# Patient Record
Sex: MALE | Race: WHITE | Employment: UNEMPLOYED | URBAN - NONMETROPOLITAN AREA
[De-identification: names, ages, dates, MRNs, and addresses within clinical notes are randomized per-mention and may not be internally consistent; named-entity substitution may affect disease eponyms.]

---

## 2018-01-29 ENCOUNTER — DOCUMENTATION ONLY (OUTPATIENT)
Dept: FAMILY MEDICINE | Facility: OTHER | Age: 43
End: 2018-01-29

## 2018-02-02 ENCOUNTER — HISTORY (OUTPATIENT)
Dept: INTERNAL MEDICINE | Facility: OTHER | Age: 43
End: 2018-02-02

## 2018-02-11 ENCOUNTER — APPOINTMENT (OUTPATIENT)
Dept: CT IMAGING | Facility: HOSPITAL | Age: 43
End: 2018-02-11
Attending: FAMILY MEDICINE
Payer: COMMERCIAL

## 2018-02-11 ENCOUNTER — APPOINTMENT (OUTPATIENT)
Dept: GENERAL RADIOLOGY | Facility: HOSPITAL | Age: 43
End: 2018-02-11
Attending: FAMILY MEDICINE
Payer: COMMERCIAL

## 2018-02-11 ENCOUNTER — TRANSFERRED RECORDS (OUTPATIENT)
Dept: HEALTH INFORMATION MANAGEMENT | Facility: HOSPITAL | Age: 43
End: 2018-02-11

## 2018-02-11 ENCOUNTER — HOSPITAL ENCOUNTER (EMERGENCY)
Facility: HOSPITAL | Age: 43
Discharge: ANOTHER HOSPITAL | End: 2018-02-11
Attending: FAMILY MEDICINE | Admitting: FAMILY MEDICINE
Payer: COMMERCIAL

## 2018-02-11 VITALS — WEIGHT: 197.75 LBS | OXYGEN SATURATION: 100 % | DIASTOLIC BLOOD PRESSURE: 109 MMHG | SYSTOLIC BLOOD PRESSURE: 155 MMHG

## 2018-02-11 DIAGNOSIS — S02.81XA: ICD-10-CM

## 2018-02-11 DIAGNOSIS — S09.90XA HEAD TRAUMA, INITIAL ENCOUNTER: ICD-10-CM

## 2018-02-11 DIAGNOSIS — I62.9 INTRACRANIAL HEMORRHAGE (H): ICD-10-CM

## 2018-02-11 LAB
ALBUMIN SERPL-MCNC: 3.9 G/DL (ref 3.4–5)
ALBUMIN UR-MCNC: 10 MG/DL
ALP SERPL-CCNC: 75 U/L (ref 40–150)
ALT SERPL W P-5'-P-CCNC: 151 U/L (ref 0–70)
ANION GAP SERPL CALCULATED.3IONS-SCNC: 10 MMOL/L (ref 3–14)
APPEARANCE UR: CLEAR
APTT PPP: 24 SEC (ref 24–37)
AST SERPL W P-5'-P-CCNC: 202 U/L (ref 0–45)
BACTERIA #/AREA URNS HPF: ABNORMAL /HPF
BASOPHILS # BLD AUTO: 0.1 10E9/L (ref 0–0.2)
BASOPHILS NFR BLD AUTO: 0.7 %
BILIRUB SERPL-MCNC: 0.3 MG/DL (ref 0.2–1.3)
BILIRUB UR QL STRIP: NEGATIVE
BUN SERPL-MCNC: 11 MG/DL (ref 7–30)
CALCIUM SERPL-MCNC: 7.4 MG/DL (ref 8.5–10.1)
CHLORIDE SERPL-SCNC: 107 MMOL/L (ref 94–109)
CO2 SERPL-SCNC: 24 MMOL/L (ref 20–32)
COLOR UR AUTO: ABNORMAL
CREAT SERPL-MCNC: 0.7 MG/DL (ref 0.66–1.25)
DIFFERENTIAL METHOD BLD: ABNORMAL
EOSINOPHIL # BLD AUTO: 0 10E9/L (ref 0–0.7)
EOSINOPHIL NFR BLD AUTO: 0.3 %
ERYTHROCYTE [DISTWIDTH] IN BLOOD BY AUTOMATED COUNT: 12 % (ref 10–15)
ETHANOL SERPL-MCNC: 0.25 G/DL
GFR SERPL CREATININE-BSD FRML MDRD: >90 ML/MIN/1.7M2
GLUCOSE BLDC GLUCOMTR-MCNC: 88 MG/DL (ref 70–99)
GLUCOSE SERPL-MCNC: 88 MG/DL (ref 70–99)
GLUCOSE UR STRIP-MCNC: NEGATIVE MG/DL
HCT VFR BLD AUTO: 42.2 % (ref 40–53)
HGB BLD-MCNC: 14.7 G/DL (ref 13.3–17.7)
HGB UR QL STRIP: ABNORMAL
IMM GRANULOCYTES # BLD: 0.1 10E9/L (ref 0–0.4)
IMM GRANULOCYTES NFR BLD: 0.4 %
INR PPP: 0.91 (ref 0.8–1.2)
KETONES UR STRIP-MCNC: NEGATIVE MG/DL
LEUKOCYTE ESTERASE UR QL STRIP: NEGATIVE
LYMPHOCYTES # BLD AUTO: 1 10E9/L (ref 0.8–5.3)
LYMPHOCYTES NFR BLD AUTO: 6.9 %
MCH RBC QN AUTO: 32.7 PG (ref 26.5–33)
MCHC RBC AUTO-ENTMCNC: 34.8 G/DL (ref 31.5–36.5)
MCV RBC AUTO: 94 FL (ref 78–100)
MONOCYTES # BLD AUTO: 0.7 10E9/L (ref 0–1.3)
MONOCYTES NFR BLD AUTO: 5 %
NEUTROPHILS # BLD AUTO: 12.2 10E9/L (ref 1.6–8.3)
NEUTROPHILS NFR BLD AUTO: 86.7 %
NITRATE UR QL: NEGATIVE
NRBC # BLD AUTO: 0 10*3/UL
NRBC BLD AUTO-RTO: 0 /100
PH UR STRIP: 6 PH (ref 4.7–8)
PLATELET # BLD AUTO: 206 10E9/L (ref 150–450)
POTASSIUM SERPL-SCNC: 3.4 MMOL/L (ref 3.4–5.3)
PROT SERPL-MCNC: 7.6 G/DL (ref 6.8–8.8)
RBC # BLD AUTO: 4.5 10E12/L (ref 4.4–5.9)
RBC #/AREA URNS AUTO: >182 /HPF (ref 0–2)
SODIUM SERPL-SCNC: 141 MMOL/L (ref 133–144)
SOURCE: ABNORMAL
SP GR UR STRIP: 1.01 (ref 1–1.03)
UROBILINOGEN UR STRIP-MCNC: NORMAL MG/DL (ref 0–2)
WBC # BLD AUTO: 14 10E9/L (ref 4–11)
WBC #/AREA URNS AUTO: 4 /HPF (ref 0–2)

## 2018-02-11 PROCEDURE — 70450 CT HEAD/BRAIN W/O DYE: CPT | Mod: TC

## 2018-02-11 PROCEDURE — 40000986 XR CHEST PORT 1 VW: Mod: TC

## 2018-02-11 PROCEDURE — 36415 COLL VENOUS BLD VENIPUNCTURE: CPT | Performed by: FAMILY MEDICINE

## 2018-02-11 PROCEDURE — 80053 COMPREHEN METABOLIC PANEL: CPT | Performed by: FAMILY MEDICINE

## 2018-02-11 PROCEDURE — 85025 COMPLETE CBC W/AUTO DIFF WBC: CPT | Performed by: FAMILY MEDICINE

## 2018-02-11 PROCEDURE — 85730 THROMBOPLASTIN TIME PARTIAL: CPT | Performed by: FAMILY MEDICINE

## 2018-02-11 PROCEDURE — 99204 OFFICE O/P NEW MOD 45 MIN: CPT | Performed by: SURGERY

## 2018-02-11 PROCEDURE — 85610 PROTHROMBIN TIME: CPT | Performed by: FAMILY MEDICINE

## 2018-02-11 PROCEDURE — 99291 CRITICAL CARE FIRST HOUR: CPT | Mod: 25

## 2018-02-11 PROCEDURE — G0390 TRAUMA RESPONS W/HOSP CRITI: HCPCS

## 2018-02-11 PROCEDURE — 72125 CT NECK SPINE W/O DYE: CPT | Mod: TC

## 2018-02-11 PROCEDURE — 81001 URINALYSIS AUTO W/SCOPE: CPT | Performed by: FAMILY MEDICINE

## 2018-02-11 PROCEDURE — 00000146 ZZHCL STATISTIC GLUCOSE BY METER IP

## 2018-02-11 PROCEDURE — 99291 CRITICAL CARE FIRST HOUR: CPT | Performed by: FAMILY MEDICINE

## 2018-02-11 PROCEDURE — 40000275 ZZH STATISTIC RCP TIME EA 10 MIN

## 2018-02-11 RX ORDER — SODIUM CHLORIDE 9 MG/ML
1000 INJECTION, SOLUTION INTRAVENOUS CONTINUOUS
Status: DISCONTINUED | OUTPATIENT
Start: 2018-02-11 | End: 2018-02-11 | Stop reason: HOSPADM

## 2018-02-11 NOTE — CONSULTS
Consult Date:  02/11/2018      TRAUMA CONSULT NOTE:        I responded to the emergency room for a level 1 trauma call regarding Mr. Jose Eduardo Mchugh.  I did arrive before the patient.  The patient apparently had gotten home at approximately 2:00 in the morning after a heavy night of drinking.  His wife was already upstairs.  He apparently came up the stairs and fell.  She is uncertain as to how many stairs he fell down, but the height of the stairs is 13 stairs.  She found him lying at the bottom of the stairs next to a heating vent that he had him with his head.  She noted in the morning that there was large dent on the vent.  She noted that he was snoring.  She thought he had just passed out and covered him with a blanket and a pillow.  At approximately 7 in the morning he started to rouse.  Sat himself up, was rubbing his left arm.  She noted that he had passed urine, changed him out of his clothes.  Apparently he did help in dressing himself, but did not verbalize.  She wanted to called the ambulance at that time, but he refused.  He then fell back into a deep sleep and was snoring.  She became more concerned and did end up calling the ambulance.  When the ambulance arrived, he again was nonverbal.  I believe he was moving a bit at that time, though was not fully responsive.  He ended up being intubated and IV fluids were started.  They did note that 1 pupil was small and fixed at that time.  His vitals remained stable in the transfer to the emergency room.      On arrival in the emergency room, he was noted to have an endotracheal tube in place.  A cervical collar in place and was on a back board.  He had had 2 large bore IVs started, 1 in each arm.  He had received approximately a liter of fluid prior to arrival.  His original vitals revealed a heart rate of 96, with a blood pressure 145/94 and O2 sats were 100%.      PHYSICAL EXAMINATION:  He had a Ihsan coma scale of 3.  He had though been given sedation for  the intubation.  He was noted to have ecchymosis around both eyes with the right being significantly more than the left, with the right side being basically closed.  Minimal ecchymosis around the left eye.  There is some serosanguineous fluid coming from his left nostril.  Pupils were small at 2-3 mm with the left being slightly bigger than the right.  Neither was responsive to light.  There is no other evidence of head trauma.  No lacerations.  Both tympanic membranes were clear, without blood. His trachea was in the midline.  There is no crepitation in the neck.   CHEST:  He did have good air entry bilaterally.  Did have some creps in both lung bases.  There was some slight wheezing.  No evidence of any external chest trauma.   HEART:  Heart  sounds were normal with normal S1 and S2.  There were no murmurs.   ABDOMEN:  He did appear to have some grayish discoloration across the lower abdomen.  Really did not have the appearance of bruising.  May have been some discoloration from his clothing.  The abdomen was not distended.  There were no masses.  No evidence of any other external trauma.  His pelvis was stable.  There was easily palpable femoral pulses bilaterally.     MUSCULOSKELETAL:  On examination of the long bones.  There were no obvious fractures.  A bit of swelling around the left forearm.  Legs revealed perhaps some old abrasions.  No bruising.  No step deformities.  There was no swelling.  Pulses in his feet were normal.      The patient was log rolled.  There is no crepitation on his spine.  No evidence of any bruising.  No skin lesions.      The patient's IVs were slowed down.      LABORATORY DATA:  Blood work was taken which revealed a hemoglobin of 14.7 with a white count of 14.0.  Electrolytes were normal.  ALT and AST were slightly elevated.  Ethanol was 0.25.  INR and PTT were normal.      It did appear that Mr. Mchugh had a significant head injury with worsening consciousness and indeed [place]  coma scale of 3.  [facility] was contacted.  They felt they would prefer to have a CT scan of his head and neck prior to transfer, in case he needed to go straight to the operating room.  He was thus transferred to the CT department.  CT scan of his head did show what appears to be the diffuse cerebral edema.  Also, appeared to be a bleed deep in the left side.  A CT scan of his neck was also carried out, to my reading there did not appear to be any acute cervical injury.      The patient was transferred to the [facility] for transfer to [facility], by helicopter.  He was maintained in a cervical collar and all movement was done with log rolling, protecting his spine.      In discussion with the wife, he is otherwise healthy.  Not on any medications.  HAS NO KNOWN ALLERGIES.  Apparently though it is not uncommon for him to come home somewhat drunk.      Throughout his stay in the emergency room and CT suite, his vitals were stable.  Was running a bit hypertensive.  This did come down with some Versed which was given by the EMTs from the helicopter crew.      The patient was thus transferred to [facility], by helicopter.      I did have a discussion with his wife and parents regarding the severity of the injury.  At the moment, it appears that this is limited to a head injury.      The patient did also have a portable chest x-ray which did not suggest any acute injury.  Endotracheal tube appeared to be in good position.         JERRY MENCHACA MD             D: 2018   T: 2018   MT: KAR      Name:     DEMI BAUTISTA   MRN:      [id number]        Account:       [id number]   :      1975           Consult Date:  2018      Document: [id number]

## 2018-02-11 NOTE — PROGRESS NOTES
Pt arrives in rm 9 intubated #7.0ETT secure at 26 cm at teeth.  Bag ventilation done.  CO2 monitor in line 35-38.  Bilateral breath sounds.  Sat 98%.  Trn to CT without incident.  Trn via chopper out.

## 2018-02-11 NOTE — ED PROVIDER NOTES
History   No chief complaint on file.    HPI  Jose Eduardo Mchugh is a 42 year old male who fell down a flight of carpeted stairs landing at the bottom and hitting his head on a metal grate.  His wife heard him fall, went to him and decided he could sleep where he was, gave him a pillow and blanket and went to bed.  At that time the patient was talking to the wife, was intoxicated but alert.  This morning when she got up she checked on him again, and at that time he was not as responsive, responded minimally.  EMS was called, BLS responded initially and placed an oral airway for snoring respirations, was intercepted by ALS who intubated and obtained 2 lines.  Patient arrived in the ED after RSI, had received vecuronium, unresponsive.  Pupils are small (1mm) and unreactive.  Dr. Brown here on patient arrival.    Problem List:    Patient Active Problem List    Diagnosis Date Noted     Disorder of upper respiratory system 07/26/2010     Priority: Medium     Overview:   cannot rule out impetigo or staph carrier       Tobacco abuse 07/26/2010     Priority: Medium        Past Medical History:    Past Medical History:   Diagnosis Date     Contact with and (suspected) exposure to other hazardous, chiefly nonmedicinal, chemicals        Past Surgical History:    Past Surgical History:   Procedure Laterality Date     OTHER SURGICAL HISTORY      90912.0,PAST SURGICAL HISTORY,none       Family History:    No family history on file.    Social History:  Marital Status:  Single [1]  Social History   Substance Use Topics     Smoking status: Not on file     Smokeless tobacco: Not on file     Alcohol use Yes      Comment: Alcoholic Drinks/day: Heavy alcohol use when not working - Captain Gonzalez        Medications:      No current outpatient prescriptions on file.      Review of Systems   Unable to perform ROS: Patient unresponsive       Physical Exam   BP: 145/94  Heart Rate: 96  SpO2: 98 %      Physical Exam   Constitutional: He appears  well-developed and well-nourished. He appears distressed.   HENT:   Head: Head is without Nieves's sign.       Right Ear: External ear normal. No hemotympanum.   Left Ear: External ear normal. No hemotympanum.   Nose: Epistaxis is observed.   Mouth/Throat: Mucous membranes are normal.   Blood in right nare.  Intubated, oral cavity not evaluated.  Teeth appear intact.   Eyes: Right pupil is not reactive. Left pupil is not reactive.   1mm pupils. Not moving eyes.   Neck: Normal range of motion. Neck supple.   Cardiovascular: Normal rate, regular rhythm, normal heart sounds and intact distal pulses.    No murmur heard.  Pulmonary/Chest: Effort normal and breath sounds normal. No respiratory distress. He has no wheezes.   Abdominal: Soft. Bowel sounds are normal. He exhibits no distension.   Musculoskeletal: He exhibits no edema or deformity.   Skeletal survey entirely negative.  CT head and neck obtained.   Neurological: He is unresponsive. GCS eye subscore is 1. GCS verbal subscore is 1. GCS motor subscore is 1.   GCS 3 after sedation, 7 prior to sedation in field.   Skin: Skin is warm and dry.   Small abrasion anterior left calf.   Psychiatric:   Unable to assess.   Nursing note and vitals reviewed.      ED Course     ED Course     Procedures    Critical Care time:  was 30 minutes for this patient excluding procedures.  Trauma:  Level of trauma activation: Full  C-collar and immobilization: applied prior to arrival.  CSpine Clearance: Patient left in collar  GCS at arrival: intubated and sedated  GCS at disposition: unchanged  Full Primary and Secondary survey with appropriate immobilization of spine completed in exam section.  Consults prior to admission or transfer: None  Procedures done in the ED: none  Lactate is greater than 2 due to trauma, at this time there is no sign of severe sepsis or septic shock.       Labs Ordered and Resulted from Time of ED Arrival Up to the Time of Departure from the ED   CBC WITH  PLATELETS DIFFERENTIAL - Abnormal; Notable for the following:        Result Value    WBC 14.0 (*)     Absolute Neutrophil 12.2 (*)     All other components within normal limits   COMPREHENSIVE METABOLIC PANEL - Abnormal; Notable for the following:     Calcium 7.4 (*)      (*)      (*)     All other components within normal limits   ALCOHOL ETHYL - Abnormal; Notable for the following:     Ethanol g/dL 0.25 (*)     All other components within normal limits   ROUTINE UA WITH MICROSCOPIC REFLEX TO CULTURE - Abnormal; Notable for the following:     Blood Urine Large (*)     Protein Albumin Urine 10 (*)     WBC Urine 4 (*)     RBC Urine >182 (*)     Bacteria Urine None (*)     All other components within normal limits   INR   PARTIAL THROMBOPLASTIN TIME   GLUCOSE BY METER   MARTY COMA SCALE (GCS) ASSESSMENT   PULSE OXIMETRY NURSING   PERIPHERAL IV CATHETER       Assessments & Plan (with Medical Decision Making)   Head trauma due to fall down stairs.  See surgeon's note for full assessment.  CT shows several areas of bleeding, cerebral edema, facial fractures of right maxillary sinus and inferior orbital rim.  Patient will be transported to [facility] via air, Dr. Terrence Oneill admitting.    I have reviewed the nursing notes.    I have reviewed the findings, diagnosis, plan and need for follow up with the patient.  New Prescriptions    No medications on file       Final diagnoses:   Head trauma, initial encounter   Intracranial hemorrhage (H) - multiple, bilateral   Closed fracture of other bone of right side of face, initial encounter (H) - right maxillary sinus and inferior orbital rim       2/11/2018   [facility]     Dotty Enriquez MD  02/11/18 1026       Dotty Enriquez MD  02/11/18 1034

## 2018-02-12 NOTE — ED NOTES
Pt's vitals remained stable in CT. After CT scan pt was transferred onto [facility] cart and placed on their monitors. Report to [facility] staff by this RN and questions answered by Dr. Brown. Pt left was taken directly from CT to helicopter. Pt's wife and family able to see pt prior to pt leaving.

## 2018-02-12 NOTE — ED NOTES
Pt arrived at 0913 with [facility] with [facility] intercept. Per EMS report pt came home around 0200 this AM after being out drinking. Pt fell down 10-12 carpeted stairs and possibly hit head on a metal air vent. Pt's wife heard him fall and he told her he was ok and refused her to call an ambulance at that time. Pt's wife gave him a pillow and blanket and he slept where he landed. Pt's wife then found him this morning and he was unresponsive. Upon [facility] arrival they noted snoring respirations with O2 sats 88% and pt was responsive to painful stimuli. They placed an oral airway and called for intercept with [facility]. Upon Elise meeting up with [facility] they intubated pt with a #7 ETT, 25cm at the lips. Initial RSI meds were ketamine and succinylcholine. EMS also gave vecuronium and versed at 0900 en route to hospital. Per [facility] pt was intubated for a GCS of 7.

## 2018-11-11 ENCOUNTER — HOSPITAL ENCOUNTER (EMERGENCY)
Facility: OTHER | Age: 43
Discharge: HOME | End: 2018-11-11
Attending: FAMILY MEDICINE | Admitting: FAMILY MEDICINE
Payer: COMMERCIAL

## 2018-11-11 VITALS
DIASTOLIC BLOOD PRESSURE: 63 MMHG | SYSTOLIC BLOOD PRESSURE: 136 MMHG | RESPIRATION RATE: 14 BRPM | TEMPERATURE: 98.2 F | HEIGHT: 70 IN | WEIGHT: 175 LBS | OXYGEN SATURATION: 98 % | HEART RATE: 73 BPM | BODY MASS INDEX: 25.05 KG/M2

## 2018-11-11 DIAGNOSIS — M79.641 PAIN OF RIGHT HAND: ICD-10-CM

## 2018-11-11 DIAGNOSIS — S63.91XA SPRAIN OF HAND, RIGHT, INITIAL ENCOUNTER: ICD-10-CM

## 2018-11-11 PROCEDURE — 99283 EMERGENCY DEPT VISIT LOW MDM: CPT | Mod: Z6 | Performed by: FAMILY MEDICINE

## 2018-11-11 PROCEDURE — 99283 EMERGENCY DEPT VISIT LOW MDM: CPT | Performed by: FAMILY MEDICINE

## 2018-11-11 ASSESSMENT — ENCOUNTER SYMPTOMS
BACK PAIN: 0
MYALGIAS: 0
NECK PAIN: 0
SHORTNESS OF BREATH: 0
NUMBNESS: 0
WEAKNESS: 0
ARTHRALGIAS: 1
ABDOMINAL PAIN: 0
WOUND: 0
FEVER: 0

## 2018-11-11 NOTE — ED TRIAGE NOTES
"ED Nursing Triage Note (General)   ________________________________    Jose Eduardo Mchugh is a 43 year old Male that presents to triage private car  With history of  Picked up for DWI tonight.  While being transferred to ED by PD, states \"kill me now, take me to the 5th floor,\" attempted to Kick , has been using marijuana and whiskey, reported by patient and PD  Significant symptoms had onset ongoing issue with pt  /63  Pulse 73  Temp 98.2  F (36.8  C) (Tympanic)  Resp 14  Ht 1.778 m (5' 10\")  Wt 79.4 kg (175 lb)  SpO2 98%  BMI 25.11 kg/m2t  Patient appears alert , in no acute distress., and cooperative behavior.  Pt states he said this because his wife kicked him out and he is homeless  GCS  14  Airway: intact  Breathing noted as Normal.  Circulation Normal  Skin some open areas on wrists due to handcuffs  Action taken:  Triage to critical care immediately      PRE HOSPITAL PRIOR LIVING SITUATION Other:  Homeless    COLUMBIA-SUICIDE SEVERITY RATING SCALE   Screen with Triage Points for Emergency Department      Ask questions that are bolded and underlined.   Past  month   Ask Questions 1 and 2 YES NO   1)  Have you wished you were dead or wished you could go to sleep and not wake up?   x   2)  Have you actually had any thoughts of killing yourself?   x   If YES to 2, ask questions 3, 4, 5, and 6.  If NO to 2, go directly to question 6.   3)  Have you been thinking about how you might do this?   E.g.  I thought about taking an overdose but I never made a specific plan as to when where or how I would actually do it .and I would never go through with it.       4)  Have you had these thoughts and had some intention of acting on them?   As opposed to  I have the thoughts but I definitely will not do anything about them.       5)  Have you started to work out or worked out the details of how to kill yourself? Do you intend to carry out this plan?      6)  Have you ever done anything, started to do " anything, or prepared to do anything to end your life?  Examples: Collected pills, obtained a gun, gave away valuables, wrote a will or suicide note, took out pills but didn t swallow any, held a gun but changed your mind or it was grabbed from your hand, went to the roof but didn t jump; or actually took pills, tried to shoot yourself, cut yourself, tried to hang yourself, etc.    If YES, ask: Was this within the past three months?  Lifetime     x    Past 3 Months        Item 1:  Behavioral Health Referral at Discharge  Item 2:  Behavioral Health Referral at Discharge   Item 3:  Behavioral Health Consult (Psychiatric Nurse/) and consider Patient Safety Precautions  Item 4:  Immediate Notification of Physician and/or Behavioral Health and Patient Safety Precautions   Item 5:  Immediate Notification of Physician and/or Behavioral Health and Patient Safety Precautions  Item 6:  Over 3 months ago: Behavioral Health Consult (Psychiatric Nurse/) and consider Patient Safety Precautions  OR  Item 6:  3 months ago or less: Immediate Notification of Physician and/or Behavioral Health and Patient Safety Precautions

## 2018-11-11 NOTE — ED AVS SNAPSHOT
[facility]    [address]    [place] [address]    Phone:  [phone]    Fax:  [phone]                                       Jose Eduardo Mchugh   MRN: [id number]    Department:  [facility]   Date of Visit:  11/11/2018           After Visit Summary Signature Page     I have received my discharge instructions, and my questions have been answered. I have discussed any challenges I see with this plan with the nurse or doctor.    ..........................................................................................................................................  Patient/Patient Representative Signature      ..........................................................................................................................................  Patient Representative Print Name and Relationship to Patient    ..................................................               ................................................  Date                                   Time    ..........................................................................................................................................  Reviewed by Signature/Title    ...................................................              ..............................................  Date                                               Time          22EPIC Rev 08/18

## 2018-11-11 NOTE — ED AVS SNAPSHOT
[facility]    [address]    [place] [address]    Phone:  [phone]    Fax:  [phone]                                       Jose Eduardo Mchugh   MRN: [id number]    Department:  [facility]   Date of Visit:  11/11/2018           Patient Information     Date Of Birth          1975        Your diagnoses for this visit were:     Pain of right hand        You were seen by Santos Pizano MD.      Follow-up Information     Follow up with No Ref-Primary, Physician. Schedule an appointment as soon as possible for a visit in 3 days.      Discharge References/Attachments     HAND SPRAIN (ENGLISH)      24 Hour Appointment Hotline     To schedule an appointment at [facility], please call [phone]. If you don't have a family doctor or clinic, we will help you find one. [facility] clinics are conveniently located to serve the needs of you and your family.           Review of your medicines      Notice     You have not been prescribed any medications.            Orders Needing Specimen Collection     None      Pending Results     No orders found from 11/9/2018 to 11/12/2018.            Pending Culture Results     No orders found from 11/9/2018 to 11/12/2018.            Pending Results Instructions     If you had any lab results that were not finalized at the time of your Discharge, you can call the ED Lab Result RN at [phone]. You will be contacted by this team for any positive Lab results or changes in treatment. The nurses are available 7 days a week from 10A to 6:30P.  You can leave a message 24 hours per day and they will return your call.        Thank you for choosing [facility]       Thank you for choosing [facility] for your care. Our goal is always to provide you with excellent care. Hearing back from our patients is one way we can continue to improve our services. Please take a few minutes to complete the written survey that you may receive in the  "mail after you visit with us. Thank you!        [facility] [facility] Information     "[employer] lets you send messages to your doctor, view your test results, renew your prescriptions, schedule appointments and more. To sign up, go to [email or web address] . Click on \"Log in\" on the left side of the screen, which will take you to the Welcome page. Then click on \"Sign up Now\" on the right side of the page.     You will be asked to enter the access code listed below, as well as some personal information. Please follow the directions to create your username and password.     Your access code is: [email or web address]  Expires: 2019  1:17 AM     Your access code will  in 90 days. If you need help or a new code, please call your [facility] clinic or [phone].        Care EveryWhere ID     This is your Care EveryWhere ID. This could be used by other organizations to access your [facility] medical records  [id number]        Equal Access to Services     [facility] AP : Hadii nisa molina hadasho Soderrekali, waaxda luqadaha, qaybta kaalmada adeegyada, guerrero roper . So [facility] [phone].    ATENCIÓN: Si habla español, tiene a kwon disposición servicios gratuitos de asistencia lingüística. Llame al [phone].    We comply with applicable federal civil rights laws and [place] laws. We do not discriminate on the basis of race, color, national origin, age, disability, sex, sexual orientation, or gender identity.            After Visit Summary       This is your record. Keep this with you and show to your community pharmacist(s) and doctor(s) at your next visit.                  "

## 2018-11-11 NOTE — ED PROVIDER NOTES
History     Chief Complaint   Patient presents with     Arm Pain     HPI  Jose Eduardo Mchugh is a 43 year old male who is brought into the emergency room by police for evaluation of right hand injury.  Patient states that he hit his hand a couple of days ago and is concerned that it may be broken.  He states that he has dull achy pain that is severe in the dorsal aspect of the right hand.  He states he also has pain that radiates up into the arm that has been there for a long time and is not any different today.  The patient had reported to police that he wanted to die however the patient denies any suicidal or homicidal ideation here with me.    The patient denies any other injuries.  Patient is intoxicated and blew a 0.291 and is arrested for DUI.    He has no further questions or complaints today.    Problem List:    Patient Active Problem List    Diagnosis Date Noted     Disorder of upper respiratory system 07/26/2010     Priority: Medium     Overview:   cannot rule out impetigo or staph carrier       Tobacco abuse 07/26/2010     Priority: Medium        Past Medical History:    Past Medical History:   Diagnosis Date     Contact with and (suspected) exposure to other hazardous, chiefly nonmedicinal, chemicals        Past Surgical History:    Past Surgical History:   Procedure Laterality Date     OTHER SURGICAL HISTORY      55478.0,PAST SURGICAL HISTORY,none       Family History:    No family history on file.    Social History:  Marital Status:   [2]  Social History   Substance Use Topics     Smoking status: Current Every Day Smoker     Smokeless tobacco: Never Used     Alcohol use Yes      Comment: Alcoholic Drinks/day: Heavy alcohol use when not working - [employer]        Medications:      No current outpatient prescriptions on file.      Review of Systems   Constitutional: Negative for fever.   Eyes: Negative for visual disturbance.   Respiratory: Negative for shortness of breath.    Cardiovascular:  "Negative for chest pain.   Gastrointestinal: Negative for abdominal pain.   Musculoskeletal: Positive for arthralgias. Negative for back pain, myalgias and neck pain.   Skin: Negative for wound.   Neurological: Negative for weakness and numbness.   All other systems reviewed and are negative.      Physical Exam   BP: 136/63  Pulse: 73  Temp: 98.2  F (36.8  C)  Resp: 14  Height: 177.8 cm (5' 10\")  Weight: 79.4 kg (175 lb)  SpO2: 98 %      Physical Exam   Constitutional: He is oriented to person, place, and time. He appears well-developed and well-nourished. No distress.   HENT:   Head: Normocephalic and atraumatic.   Right Ear: External ear normal.   Left Ear: External ear normal.   Nose: Nose normal.   Mouth/Throat: Oropharynx is clear and moist.   Eyes: Conjunctivae and EOM are normal. Pupils are equal, round, and reactive to light.   Neck: Normal range of motion. Neck supple.   Cardiovascular: Normal rate, regular rhythm, normal heart sounds and intact distal pulses.    No murmur heard.  Pulmonary/Chest: Effort normal and breath sounds normal. He has no rales.   Abdominal: Soft. Bowel sounds are normal. There is no tenderness.   Musculoskeletal: Normal range of motion. He exhibits no edema.        Right hand: He exhibits tenderness. He exhibits normal range of motion, no bony tenderness, normal two-point discrimination, normal capillary refill, no deformity, no laceration and no swelling. Normal sensation noted. Normal strength noted.        Hands:  Lymphadenopathy:     He has no cervical adenopathy.   Neurological: He is alert and oriented to person, place, and time. He has normal strength. No cranial nerve deficit or sensory deficit. GCS eye subscore is 4. GCS verbal subscore is 5. GCS motor subscore is 6.   Skin: Skin is warm and dry. Abrasion noted. No bruising and no laceration noted. He is not diaphoretic.   Minor abrasions to back of right wrist and hand.    Psychiatric: His affect is angry and " inappropriate. His speech is slurred. He is agitated. Thought content is not paranoid and not delusional. Cognition and memory are normal. He expresses impulsivity and inappropriate judgment. He expresses no homicidal and no suicidal ideation. He expresses no suicidal plans and no homicidal plans.   Nursing note and vitals reviewed.      ED Course     ED Course     Procedures    No results found for this or any previous visit (from the past 24 hour(s)).    Medications - No data to display  The patient refused x-rays and labs.  He states that he is not suicidal or homicidal.  He states that he was just mouthing off to the .  He has no plan.    The patient states that he just wants to go to [facility].    Patient is discharged into police custody.  Assessments & Plan (with Medical Decision Making)     I have reviewed the nursing notes.    I have reviewed the findings, diagnosis, plan and need for follow up with the patient.    New Prescriptions    No medications on file       Final diagnoses:   Pain of right hand   Sprain of hand, right, initial encounter       11/11/2018   [facility] AND [facility]     Santos Pizano MD  11/11/18 0120

## 2021-09-06 ENCOUNTER — HOSPITAL ENCOUNTER (EMERGENCY)
Facility: OTHER | Age: 46
Discharge: OTHER INSTITUTION | End: 2021-09-07
Attending: EMERGENCY MEDICINE | Admitting: EMERGENCY MEDICINE
Payer: COMMERCIAL

## 2021-09-06 DIAGNOSIS — F10.920 ALCOHOLIC INTOXICATION WITHOUT COMPLICATION (H): ICD-10-CM

## 2021-09-06 LAB
ALBUMIN SERPL-MCNC: 4.5 G/DL (ref 3.5–5.7)
ALP SERPL-CCNC: 99 U/L (ref 34–104)
ALT SERPL W P-5'-P-CCNC: 198 U/L (ref 7–52)
AMPHETAMINES UR QL: NOT DETECTED
ANION GAP SERPL CALCULATED.3IONS-SCNC: 10 MMOL/L (ref 3–14)
AST SERPL W P-5'-P-CCNC: 256 U/L (ref 13–39)
BARBITURATES UR QL SCN: NOT DETECTED
BASOPHILS # BLD AUTO: 0.1 10E3/UL (ref 0–0.2)
BASOPHILS NFR BLD AUTO: 3 %
BENZODIAZ UR QL SCN: NOT DETECTED
BILIRUB SERPL-MCNC: 0.5 MG/DL (ref 0.3–1)
BUN SERPL-MCNC: 8 MG/DL (ref 7–25)
BUPRENORPHINE UR QL: NOT DETECTED
CALCIUM SERPL-MCNC: 9.5 MG/DL (ref 8.6–10.3)
CANNABINOIDS UR QL: ABNORMAL
CHLORIDE BLD-SCNC: 105 MMOL/L (ref 98–107)
CO2 SERPL-SCNC: 27 MMOL/L (ref 21–31)
COCAINE UR QL SCN: NOT DETECTED
CREAT SERPL-MCNC: 0.86 MG/DL (ref 0.7–1.3)
D-METHAMPHET UR QL: NOT DETECTED
EOSINOPHIL # BLD AUTO: 0.3 10E3/UL (ref 0–0.7)
EOSINOPHIL NFR BLD AUTO: 5 %
ERYTHROCYTE [DISTWIDTH] IN BLOOD BY AUTOMATED COUNT: 12.9 % (ref 10–15)
ETHANOL SERPL-MCNC: 0.45 G/DL
GFR SERPL CREATININE-BSD FRML MDRD: >90 ML/MIN/1.73M2
GLUCOSE BLD-MCNC: 98 MG/DL (ref 70–105)
HCT VFR BLD AUTO: 42.8 % (ref 40–53)
HGB BLD-MCNC: 15 G/DL (ref 13.3–17.7)
IMM GRANULOCYTES # BLD: 0 10E3/UL
IMM GRANULOCYTES NFR BLD: 0 %
LYMPHOCYTES # BLD AUTO: 1.8 10E3/UL (ref 0.8–5.3)
LYMPHOCYTES NFR BLD AUTO: 33 %
MAGNESIUM SERPL-MCNC: 2.3 MG/DL (ref 1.9–2.7)
MCH RBC QN AUTO: 33.3 PG (ref 26.5–33)
MCHC RBC AUTO-ENTMCNC: 35 G/DL (ref 31.5–36.5)
MCV RBC AUTO: 95 FL (ref 78–100)
METHADONE UR QL SCN: NOT DETECTED
MONOCYTES # BLD AUTO: 0.6 10E3/UL (ref 0–1.3)
MONOCYTES NFR BLD AUTO: 11 %
NEUTROPHILS # BLD AUTO: 2.6 10E3/UL (ref 1.6–8.3)
NEUTROPHILS NFR BLD AUTO: 48 %
NRBC # BLD AUTO: 0 10E3/UL
NRBC BLD AUTO-RTO: 0 /100
OPIATES UR QL SCN: NOT DETECTED
OXYCODONE UR QL SCN: NOT DETECTED
PCP UR QL SCN: NOT DETECTED
PLATELET # BLD AUTO: 232 10E3/UL (ref 150–450)
POTASSIUM BLD-SCNC: 3.5 MMOL/L (ref 3.5–5.1)
PROPOXYPH UR QL: NOT DETECTED
PROT SERPL-MCNC: 8.1 G/DL (ref 6.4–8.9)
RBC # BLD AUTO: 4.5 10E6/UL (ref 4.4–5.9)
SODIUM SERPL-SCNC: 142 MMOL/L (ref 134–144)
TRICYCLICS UR QL SCN: NOT DETECTED
WBC # BLD AUTO: 5.5 10E3/UL (ref 4–11)

## 2021-09-06 PROCEDURE — 80143 DRUG ASSAY ACETAMINOPHEN: CPT | Performed by: EMERGENCY MEDICINE

## 2021-09-06 PROCEDURE — 96361 HYDRATE IV INFUSION ADD-ON: CPT | Performed by: EMERGENCY MEDICINE

## 2021-09-06 PROCEDURE — 99284 EMERGENCY DEPT VISIT MOD MDM: CPT | Mod: 25 | Performed by: EMERGENCY MEDICINE

## 2021-09-06 PROCEDURE — 96360 HYDRATION IV INFUSION INIT: CPT | Performed by: EMERGENCY MEDICINE

## 2021-09-06 PROCEDURE — 36415 COLL VENOUS BLD VENIPUNCTURE: CPT | Performed by: EMERGENCY MEDICINE

## 2021-09-06 PROCEDURE — 83735 ASSAY OF MAGNESIUM: CPT | Performed by: EMERGENCY MEDICINE

## 2021-09-06 PROCEDURE — 83690 ASSAY OF LIPASE: CPT | Performed by: EMERGENCY MEDICINE

## 2021-09-06 PROCEDURE — 80306 DRUG TEST PRSMV INSTRMNT: CPT | Performed by: EMERGENCY MEDICINE

## 2021-09-06 PROCEDURE — 82077 ASSAY SPEC XCP UR&BREATH IA: CPT | Performed by: EMERGENCY MEDICINE

## 2021-09-06 PROCEDURE — 250N000013 HC RX MED GY IP 250 OP 250 PS 637: Performed by: EMERGENCY MEDICINE

## 2021-09-06 PROCEDURE — 82310 ASSAY OF CALCIUM: CPT | Performed by: EMERGENCY MEDICINE

## 2021-09-06 PROCEDURE — 85025 COMPLETE CBC W/AUTO DIFF WBC: CPT | Performed by: EMERGENCY MEDICINE

## 2021-09-06 PROCEDURE — 80053 COMPREHEN METABOLIC PANEL: CPT | Performed by: EMERGENCY MEDICINE

## 2021-09-06 PROCEDURE — 258N000003 HC RX IP 258 OP 636: Performed by: EMERGENCY MEDICINE

## 2021-09-06 PROCEDURE — 99284 EMERGENCY DEPT VISIT MOD MDM: CPT | Performed by: EMERGENCY MEDICINE

## 2021-09-06 RX ORDER — SODIUM CHLORIDE, SODIUM LACTATE, POTASSIUM CHLORIDE, CALCIUM CHLORIDE 600; 310; 30; 20 MG/100ML; MG/100ML; MG/100ML; MG/100ML
1000 INJECTION, SOLUTION INTRAVENOUS CONTINUOUS
Status: DISCONTINUED | OUTPATIENT
Start: 2021-09-06 | End: 2021-09-07 | Stop reason: HOSPADM

## 2021-09-06 RX ORDER — LANOLIN ALCOHOL/MO/W.PET/CERES
100 CREAM (GRAM) TOPICAL ONCE
Status: COMPLETED | OUTPATIENT
Start: 2021-09-06 | End: 2021-09-06

## 2021-09-06 RX ORDER — NICOTINE 21 MG/24HR
1 PATCH, TRANSDERMAL 24 HOURS TRANSDERMAL DAILY
Status: DISCONTINUED | OUTPATIENT
Start: 2021-09-07 | End: 2021-09-07 | Stop reason: HOSPADM

## 2021-09-06 RX ORDER — UREA 10 %
1000 LOTION (ML) TOPICAL ONCE
Status: DISCONTINUED | OUTPATIENT
Start: 2021-09-06 | End: 2021-09-07 | Stop reason: HOSPADM

## 2021-09-06 RX ORDER — FOLIC ACID 1 MG/1
1 TABLET ORAL ONCE
Status: COMPLETED | OUTPATIENT
Start: 2021-09-06 | End: 2021-09-06

## 2021-09-06 RX ADMIN — THIAMINE HCL TAB 100 MG 100 MG: 100 TAB at 23:53

## 2021-09-06 RX ADMIN — FOLIC ACID 1 MG: 1 TABLET ORAL at 23:53

## 2021-09-06 RX ADMIN — SODIUM CHLORIDE, POTASSIUM CHLORIDE, SODIUM LACTATE AND CALCIUM CHLORIDE 500 ML: 600; 310; 30; 20 INJECTION, SOLUTION INTRAVENOUS at 23:53

## 2021-09-07 VITALS
DIASTOLIC BLOOD PRESSURE: 76 MMHG | TEMPERATURE: 98.1 F | HEART RATE: 73 BPM | OXYGEN SATURATION: 97 % | WEIGHT: 183 LBS | SYSTOLIC BLOOD PRESSURE: 117 MMHG | RESPIRATION RATE: 18 BRPM | BODY MASS INDEX: 26.26 KG/M2

## 2021-09-07 LAB
ALBUMIN SERPL-MCNC: 4 G/DL (ref 3.5–5.7)
ALP SERPL-CCNC: 82 U/L (ref 34–104)
ALT SERPL W P-5'-P-CCNC: 166 U/L (ref 7–52)
ANION GAP SERPL CALCULATED.3IONS-SCNC: 12 MMOL/L (ref 3–14)
APAP SERPL-MCNC: <2 MG/L (ref 10–30)
AST SERPL W P-5'-P-CCNC: 199 U/L (ref 13–39)
BILIRUB SERPL-MCNC: 0.4 MG/DL (ref 0.3–1)
BUN SERPL-MCNC: 9 MG/DL (ref 7–25)
CALCIUM SERPL-MCNC: 8.8 MG/DL (ref 8.6–10.3)
CHLORIDE BLD-SCNC: 108 MMOL/L (ref 98–107)
CO2 SERPL-SCNC: 24 MMOL/L (ref 21–31)
CREAT SERPL-MCNC: 0.79 MG/DL (ref 0.7–1.3)
ETHANOL SERPL-MCNC: 0.23 G/DL
GFR SERPL CREATININE-BSD FRML MDRD: >90 ML/MIN/1.73M2
GLUCOSE BLD-MCNC: 85 MG/DL (ref 70–105)
INR PPP: 1.09 (ref 0.85–1.15)
LIPASE SERPL-CCNC: 79 U/L (ref 11–82)
POTASSIUM BLD-SCNC: 3.3 MMOL/L (ref 3.5–5.1)
PROT SERPL-MCNC: 6.9 G/DL (ref 6.4–8.9)
SARS-COV-2 RNA RESP QL NAA+PROBE: NEGATIVE
SODIUM SERPL-SCNC: 144 MMOL/L (ref 134–144)

## 2021-09-07 PROCEDURE — 36415 COLL VENOUS BLD VENIPUNCTURE: CPT | Performed by: EMERGENCY MEDICINE

## 2021-09-07 PROCEDURE — 85610 PROTHROMBIN TIME: CPT | Performed by: EMERGENCY MEDICINE

## 2021-09-07 PROCEDURE — 82077 ASSAY SPEC XCP UR&BREATH IA: CPT | Performed by: EMERGENCY MEDICINE

## 2021-09-07 PROCEDURE — U0003 INFECTIOUS AGENT DETECTION BY NUCLEIC ACID (DNA OR RNA); SEVERE ACUTE RESPIRATORY SYNDROME CORONAVIRUS 2 (SARS-COV-2) (CORONAVIRUS DISEASE [COVID-19]), AMPLIFIED PROBE TECHNIQUE, MAKING USE OF HIGH THROUGHPUT TECHNOLOGIES AS DESCRIBED BY CMS-2020-01-R: HCPCS | Performed by: EMERGENCY MEDICINE

## 2021-09-07 PROCEDURE — 82040 ASSAY OF SERUM ALBUMIN: CPT | Performed by: EMERGENCY MEDICINE

## 2021-09-07 NOTE — PROGRESS NOTES
MRN:[id number]                      After Visit Summary   6/26/2017    Johanny Rojas    MRN: [id number]           Thank you!     Thank you for choosing [facility] for your care. Our goal is always to provide you with excellent care. Hearing back from our patients is one way we can continue to improve our services. Please take a few minutes to complete the written survey that you may receive in the mail after you visit with us. Thank you!        Patient Information     Date Of Birth          1979        About your hospital stay     You were admitted on:  June 26, 2017 You last received care in the:  [facility] II    You were discharged on:  June 26, 2017       Who to Call     For medical emergencies, please call 911.  For non-urgent questions about your medical care, please call your primary care provider or clinic, [phone]  For questions related to your surgery, please call your surgery clinic        Attending Provider     Provider Morales Hinds MD [facility]       Primary Care Provider Office Phone # Fax #    Safia Polo PA-C [phone]      Your next 10 appointments already scheduled     Jun 27, 2017  9:10 AM CDT   SHORT with Morales Li MD   [facility]    [address] [place] [id number] [phone]            Jun 30, 2017 11:00 AM CDT   SHORT with Morales Li MD   [facility] ([facility])    [address] [place] [id number] [phone]              Further instructions from your care team       Instructions from Dr Li after your Total  Laparoscopic  Hysterectomy:    1. Please make an appointment to be seen at his clinic within the next few days. The number to call is [phone](his nurse) or the main number is [phone]. Also you will need to be seen 6 weeks after surgery  [place] [employer] was contacted.  They will be able to transport patient detox for us.   Amee Moncada RN on 9/7/2021 at 9:20 AM     for a final check before returning to usual activities.    2. If you should develop any concerns, such as heavy vaginal bleeding, fever, vomiting or increasing pain, you should call his nurse at the above number to be worked in for an appointment, or go to the emergency room if after hours or on a weekend.    3. You should expect some pain for the first several weeks which should gradually decrease day by day. Use your pain medication as needed. The narcotic medication will make you constipated so be somewhat careful about using this. In general we expect that you should NOT be using the narcotic medication after the first week after surgery is over.Try to use the ibuprofen first and then the narcotic if you need more pain relief.    4. Expect that you might see some vaginal bleeding- mostly spotting- but perhaps you may pass a clot or two in the first several weeks after surgery- This can actually occur anytime in the first 3 months after this type of surgery. Mild clotting/spotting is ok but something like a heavy menstrual period should definitely be reported right away.. If the bleeding seems to be more than that, please let Dr Li know about it. Also watch for signs of infection, which would be fever or redness at the incision sites if there are any incisions, or foul smelling vaginal discharge.    5. Avoid intercourse until your 6 week post-operative exam. Don't lift anything over 10 lbs for 12  weeks after your surgery.    6. If you have any questions or concerns please don't hesitate to contact Dr Li.    7.You have been given a prescription for pyridium., These pills will turn your urine bright orange. They help you with soothing the sting / burning with urination. You can take one pill up to three times a day- usually with meals- for up to 2 days. Usually the burning with urination resolves in the first day after surgery. Then you can stop the pills.               Morales Li MD, FACOG,  FAAFP              , OB/GYN  Department.  [facility]   Adult Discharge Orders & Instructions     For 24 hours after surgery    1. Get plenty of rest.  A responsible adult must stay with you for at least 24 hours after you leave the hospital.   2. Do not drive or use heavy equipment.  If you have weakness or tingling, don't drive or use heavy equipment until this feeling goes away.  3. Do not drink alcohol.  4. Avoid strenuous or risky activities.  Ask for help when climbing stairs.   5. You may feel lightheaded.  IF so, sit for a few minutes before standing.  Have someone help you get up.   6. If you have nausea (feel sick to your stomach): Drink only clear liquids such as apple juice, ginger ale, broth or 7-Up.  Rest may also help.  Be sure to drink enough fluids.  Move to a regular diet as you feel able.  7. You may have a slight fever. Call the doctor if your fever is over 100 F (37.7 C) (taken under the tongue) or lasts longer than 24 hours.  8. You may have a dry mouth, a sore throat, muscle aches or trouble sleeping.  These should go away after 24 hours.  9. Do not make important or legal decisions.   Call your doctor for any of the followin.  Signs of infection (fever, growing tenderness at the surgery site, a large amount of drainage or bleeding, severe pain, foul-smelling drainage, redness, swelling).    2. It has been over 8 to 10 hours since surgery and you are still not able to urinate (pass water).    3.  Headache for over 24 hours.       [facility] Nurse Advice no. [phone]        Pending Results     Date and Time Order Name Status Description    2017 0910 Surgical pathology exam In process             Admission Information     Date & Time Provider Department Dept. Phone    2017 Morales Li MD [facility] Phase [phone]      Your Vitals Were     Blood Pressure Pulse Temperature Respirations Pulse Oximetry       116/72 72  97.6  F (36.4  C) (Temporal) 20 98%       MyChart Information     [employer] gives you secure access to your electronic health record. If you see a primary care provider, you can also send messages to your care team and make appointments. If you have questions, please call your primary care clinic.  If you do not have a primary care provider, please call [phone] and they will assist you.        Care EveryWhere ID     This is your Care EveryWhere ID. This could be used by other organizations to access your [facility] medical records  [id number]        Equal Access to Services     [facility]: Hadhenry Catherine, wacolten simon, geno fieldsaljaylen mobley, jarred nelson . So [facility] [phone].    ATENCIÓN: Si habla español, tiene a gan disposición servicios gratuitos de asistencia lingüística. [facility] [phone].    We comply with applicable federal civil rights laws and [place] laws. We do not discriminate on the basis of race, color, national origin, age, disability sex, sexual orientation or gender identity.               Review of your medicines      START taking        Dose / Directions    cephALEXin 500 MG capsule   Commonly known as:  KEFLEX   Used for:  S/P hysterectomy        1 capsule 3 times a day for 5 days   Quantity:  15 capsule   Refills:  0       ibuprofen 600 MG tablet   Commonly known as:  ADVIL/MOTRIN   Used for:  S/P hysterectomy        Dose:  600 mg   Take 1 tablet (600 mg) by mouth every 6 hours as needed for moderate pain   Quantity:  60 tablet   Refills:  1       oxyCODONE-acetaminophen 5-325 MG per tablet   Commonly known as:  PERCOCET   Used for:  S/P hysterectomy        Dose:  1-2 tablet   Take 1-2 tablets by mouth every 6 hours as needed for moderate to severe pain   Quantity:  50 tablet   Refills:  0       phenazopyridine 200 MG tablet   Commonly known as:  PYRIDIUM   Used for:  S/P hysterectomy        Dose:  200 mg   Take 1 tablet (200 mg) by  mouth 3 times daily as needed for irritation Expect your urine to appear bright orange   Quantity:  6 tablet   Refills:  0       senna-docusate 8.6-50 MG per tablet   Commonly known as:  SENOKOT-S;PERICOLACE   Used for:  S/P hysterectomy        Dose:  1-2 tablet   Take 1-2 tablets by mouth daily as needed for constipation   Quantity:  30 tablet   Refills:  3         CONTINUE these medicines which have NOT CHANGED        Dose / Directions    ALPRAZolam 0.25 MG tablet   Commonly known as:  XANAX   Used for:  Adjustment disorder with mixed anxiety and depressed mood        Dose:  0.25 mg   Take 1 tablet (0.25 mg) by mouth 3 times daily as needed for anxiety   Quantity:  30 tablet   Refills:  0       citalopram 20 MG tablet   Commonly known as:  celeXA   Used for:  Adjustment disorder with mixed anxiety and depressed mood        Dose:  20 mg   Take 1 tablet (20 mg) by mouth daily   Quantity:  90 tablet   Refills:  3       Multi-vitamin Tabs tablet        Dose:  1 tablet   Take 1 tablet by mouth daily   Refills:  0       rizatriptan 5 MG ODT tab   Commonly known as:  MAXALT-MLT   Used for:  Migraine without aura and with status migrainosus, not intractable        Dose:  5-10 mg   Take 1-2 tablets (5-10 mg) by mouth at onset of headache for migraine May repeat dose in 2 hours.  Do not exceed 30 mg in 24 hours   Quantity:  18 tablet   Refills:  1       VITAMIN C PO        Reported on 4/17/2017   Refills:  0       Zinc 50 MG Caps        Take by mouth daily Reported on 4/17/2017   Refills:  0            Where to get your medicines      These medications were sent to [facility], [place] - [address]  [address], [place] [address]     Phone:  [phone]     cephALEXin 500 MG capsule    ibuprofen 600 MG tablet    phenazopyridine 200 MG tablet    senna-docusate 8.6-50 MG per tablet         Some of these will need a paper prescription and others can be bought over the counter. Ask your nurse  if you have questions.     Bring a paper prescription for each of these medications     oxyCODONE-acetaminophen 5-325 MG per tablet                Protect others around you: Learn how to safely use, store and throw away your medicines at www.disposemymeds.org.             Medication List: This is a list of all your medications and when to take them. Check marks below indicate your daily home schedule. Keep this list as a reference.      Medications           Morning Afternoon Evening Bedtime As Needed    ALPRAZolam 0.25 MG tablet   Commonly known as:  XANAX   Take 1 tablet (0.25 mg) by mouth 3 times daily as needed for anxiety                                cephALEXin 500 MG capsule   Commonly known as:  KEFLEX   1 capsule 3 times a day for 5 days                                citalopram 20 MG tablet   Commonly known as:  celeXA   Take 1 tablet (20 mg) by mouth daily                                ibuprofen 600 MG tablet   Commonly known as:  ADVIL/MOTRIN   Take 1 tablet (600 mg) by mouth every 6 hours as needed for moderate pain   Last time this was given:  600 mg on 6/26/2017  1:38 PM                                Multi-vitamin Tabs tablet   Take 1 tablet by mouth daily                                oxyCODONE-acetaminophen 5-325 MG per tablet   Commonly known as:  PERCOCET   Take 1-2 tablets by mouth every 6 hours as needed for moderate to severe pain   Last time this was given:  1 tablet on 6/26/2017  1:13 PM                                phenazopyridine 200 MG tablet   Commonly known as:  PYRIDIUM   Take 1 tablet (200 mg) by mouth 3 times daily as needed for irritation Expect your urine to appear bright orange   Last time this was given:  200 mg on 6/26/2017  1:38 PM                                rizatriptan 5 MG ODT tab   Commonly known as:  MAXALT-MLT   Take 1-2 tablets (5-10 mg) by mouth at onset of headache for migraine May repeat dose in 2 hours.  Do not exceed 30 mg in 24 hours                                 senna-docusate 8.6-50 MG per tablet   Commonly known as:  SENOKOT-S;PERICOLACE   Take 1-2 tablets by mouth daily as needed for constipation                                VITAMIN C PO   Reported on 4/17/2017                                Zinc 50 MG Caps   Take by mouth daily Reported on 4/17/2017                                          More Information        Discharge Instructions for Laparoscopic Hysterectomy  You had a procedure called laparoscopic hysterectomy. A surgeon removed your uterus using instruments inserted through small incisions in your abdomen. These incisions may be tender or sore. You may also have pain in your upper back or shoulders. This is from the gas used to enlarge your abdomen to allow your doctor to see inside your pelvis and perform the procedure. This pain usually goes away in a day or two. It usually takes from 1 to 4 weeks to recover from laparoscopic hysterectomy. Remember, though, that recovery time varies from woman to woman. Here's what you can do to speed your recovery following surgery.  Home care     Continue the coughing and deep breathing exercises that you learned in the hospital.    Take your medications exactly as directed by your doctor.    Avoid constipation.    Eat fruits, vegetables, and whole grains.    Drink 6 to 8 glasses of water a day, unless told to do otherwise.    Use a laxative or a mild stool softener if your doctor says it's OK.    Shower as usual. Wash your incisions with mild soap and water. Pat dry.    Don't use oils, powders, or lotions on your incisions.    Don't put anything in your vagina until your doctor says it's safe to do so. Don't use tampons or douches. Don't have sex.    If you had both ovaries removed, report hot flashes, mood swings, and irritability to your doctor. There may be medications that can help you.  Activity    Ask your doctor when you can start driving again. It's usually okay to drive as soon as you are free of pain and  able to move comfortably from side to side. Don't drive while you are still taking opioid pain medications.    Ask others to help with chores and errands while you recover.    Don t lift anything heavier than 10 pounds for 6 weeks.    Don t vacuum or do other strenuous activities until the doctor says it's OK.    Walk as often as you feel able.    Don't drive for a few days after the surgery. You may drive as soon as you are able to move comfortably from side to side and when you are no longer taking narcotics.    Climb stairs slowly and pause after every few steps.  Follow-up care  Make a follow-up appointment as directed by our staff.     When to call your doctor  Call your doctor right away if you have any of the following:    Fever above 100.4 F (38 C) or chills    Bright red vaginal bleeding or vaginal bleeding that soaks more than one sanitary pad per hour    A foul smelling discharge from the vagina    Trouble urinating or burning when you urinate    Severe pain or bloating in your abdomen    Redness, swelling, or drainage at your incision sites    Shortness of breath or chest pain    Nausea and vomiting   Date Last Reviewed: 5/19/2015 2000-2017 The [employer]. [address] [place] [address]. All rights reserved. This information is not intended as a substitute for professional medical care. Always follow your healthcare professional's instructions.                Merida Catheter Care    A Merida catheter is a rubber tube that is placed through the urethra (opening where urine comes out) and into the bladder. This helps drain urine from the bladder. There is a small balloon on the end of the tube that is inflated after insertion. This keeps the catheter from sliding out of the bladder.  A Merida catheter is used to treat urinary retention (unable to pass urine). It is also used when there is incontinence (loss of bladder control).  Home care    Finish taking any prescribed antibiotic  even if you are feeling better before then.    It is important to keep bacteria from getting into the collection bag. Do not disconnect the catheter from the collection bag.    Use a leg band to secure the drainage tube, so it does not pull on the catheter. Drain the collection bag when it becomes full using the drain spout at the bottom of the bag.    Do not try to pull or remove your catheter. This will injure your urethra. It must be removed by your healthcare provider or nurse.  Follow-up care  Follow up with your healthcare provider as advised for repeat urine testing and catheter removal or replacement.  When to seek medical advice  Call your healthcare provider right away if any of these occur:    Fever of 100.4 F (38 C) or higher, or as directed by your healthcare provider    Bladder pain or fullness    Abdominal swelling, nausea or vomiting, or back pain    Blood or urine leakage around the catheter    Bloody urine coming from the catheter (if a new symptom)    Catheter falls out    Catheter stops draining for 6 hours    Weakness, dizziness, or fainting  Date Last Reviewed: 10/1/2016    1461-8175 The [employer]. [address] [place] [address]. All rights reserved. This information is not intended as a substitute for professional medical care. Always follow your healthcare professional's instructions.                Discharge Instructions: Caring for Your Indwelling Urinary Catheter  You have been discharged with an indwelling urinary catheter (also called a Merida catheter). A catheter is a thin, flexible tube. An indwelling urinary catheter has two parts. The first part is a tube that drains urine from your bladder. The second part is a bag or other device that collects the urine.  The most important thing to remember is that you want to prevent infection. Always wash your hands before handling your catheter bag or tubing.  Draining the bedside bag    Wash your hands with soap and  clean, running water or use an alcohol-based hand  that contains at least 60% alcohol.    Hold the drainage tube over a toilet or measuring container.    Unclamp the tube and let the bag drain.    Don t touch the tip of the drainage tube or let it touch the toilet or container.  Cleaning the drainage tube    When the bag is empty, clean the tip of the drainage tube with an alcohol wipe.    Clamp the tube.    Reinsert the tube into the pocket on the drainage bag.  Cleaning your skin and tubing    Clean the skin near the catheter with soap and water.    Wash your genital area from front to back.    Wash the catheter tubing. Always wash the catheter in the direction away from your body.    You will be told when and how to change your bag and tubing.    Don t try to remove the catheter by yourself.    You may shower with the catheter in place.  Emptying a leg bag    Wash your hands.    Remove the stopper on the bag.    Drain the bag into the toilet or a measuring container. Don t let the tip of the drainage tube touch anything, including your fingers.    Clean the tip of the drainage tube with alcohol.    Replace the stopper.  Follow-up care  Make a follow-up appointment as directed by your healthcare provider  When to call your healthcare provider  Call your healthcare provider right away if you have any of the following:    Chills or fever above 100.4 F (38 C)    Leakage around the catheter insertion site    Increased spasms (uncontrollable twitching) in your legs, abdomen, or bladder. Occasional mild spasms are normal.    Burning in the urinary tract, penis, or genital area    Nausea and vomiting    Aching in the lower back    Cloudy or bloody (pink or red) urine, sediment or mucus in the urine, or foul-smelling urine   Date Last Reviewed: 1/1/2017 2000-2017 The [employer]. [address] [place] [address], [place] [address]. All rights reserved. This information is not intended as a substitute for  professional medical care. Always follow your healthcare professional's instructions.

## 2021-09-07 NOTE — ED NOTES
Patient can be heard on yelling in screaming in his room.  Patient was on the phone with his dad.  Patient was asked twice to stop the yelling and screaming.  The last time this writer was in patient's room he was informed that if he continues to yell and scream, he would be placed on a hold.   Patient has been quit since.    Amee Moncada RN on 9/7/2021 at 8:52 AM

## 2021-09-07 NOTE — ED PROVIDER NOTES
"  History     Chief Complaint   Patient presents with     Alcohol Intoxication     HPI  Jose Eduardo Mchugh is a 46 year old male who presents the emergency department for medical clearance for detox.  Patient is staying with his father currently and father called police department for help bringing him to detox.  Patient was voluntarily admitted to detox on August 30 but left earlier and has been drinking heavily ever since.  Breathalyzed 0.37 when police arrived but refused to come to the hospital until he finished his bottle of Captain Carlos.  Patient reports that he thinks his father called the police because he used to be a meth user and thinks his father is confused and thinks he still uses meth.  Patient reports he drinks \"a couple drinks\" a day.  He does not think he is that drunk right now and does not think he needs to be here.  He does note some abrasions and contusions on his upper and lower extremities which are old.  He denies recent falls or head trauma.  He denies vomiting, vision changes, numbness/weakness, chest pain or abdominal pain.  He reports he has been through withdrawal but it was \"not that bad\" and denies a history of seizures.    Allergies:  No Known Allergies    Problem List:    Patient Active Problem List    Diagnosis Date Noted     Disorder of upper respiratory system 07/26/2010     Priority: Medium     Overview:   cannot rule out impetigo or staph carrier       Tobacco abuse 07/26/2010     Priority: Medium        Past Medical History:    Past Medical History:   Diagnosis Date     Contact with and (suspected) exposure to other hazardous, chiefly nonmedicinal, chemicals        Past Surgical History:    Past Surgical History:   Procedure Laterality Date     OTHER SURGICAL HISTORY      67492.0,PAST SURGICAL HISTORY,none       Family History:    History reviewed. No pertinent family history.    Social History:  Marital Status:   [2]  Social History     Tobacco Use     Smoking status: " Current Every Day Smoker     Smokeless tobacco: Never Used   Substance Use Topics     Alcohol use: Yes     Comment: Alcoholic Drinks/day: Heavy alcohol use when not working - Captain Gonzalez     Drug use: Yes     Types: Marijuana        Medications:    No current outpatient medications on file.        Review of Systems  Please seen HPI for pertinent positives and negatives. All other systems reviewed and found to be negative.   Physical Exam   BP: (!) 146/88  Pulse: 97  Temp: 98.1  F (36.7  C)  Resp: 18  Weight: 83 kg (183 lb)  SpO2: 98 %      Physical Exam  Constitutional:       General: He is not in acute distress.     Appearance: He is not ill-appearing.   HENT:      Head: Normocephalic and atraumatic.      Nose: Nose normal.      Mouth/Throat:      Mouth: Mucous membranes are moist.      Pharynx: Oropharynx is clear.   Eyes:      Extraocular Movements: Extraocular movements intact.      Conjunctiva/sclera: Conjunctivae normal.      Pupils: Pupils are equal, round, and reactive to light.   Cardiovascular:      Rate and Rhythm: Normal rate and regular rhythm.   Pulmonary:      Effort: Pulmonary effort is normal.      Breath sounds: Normal breath sounds.   Abdominal:      Palpations: Abdomen is soft.      Tenderness: There is no abdominal tenderness.   Musculoskeletal:      Cervical back: Normal range of motion.      Right lower leg: No edema.      Left lower leg: No edema.      Comments: 5/5 strength upper and lower extremities    Skin:     General: Skin is warm and dry.      Comments: Bruise on R elbow and L shin. Abrasions to L forearm   Neurological:      Mental Status: He is alert and oriented to person, place, and time.      Cranial Nerves: No cranial nerve deficit.      Sensory: No sensory deficit.         ED Course     ED Course as of Sep 07 0916   Mon Sep 06, 2021   2323 AST(!): 256   2323 ALT(!): 198     Procedures              Critical Care time:  none               Results for orders placed or performed  during the hospital encounter of 09/06/21 (from the past 24 hour(s))   Urine Drugs of Abuse Screen    Narrative    The following orders were created for panel order Urine Drugs of Abuse Screen.  Procedure                               Abnormality         Status                     ---------                               -----------         ------                     Urine Drugs of Abuse Scr...[[id number]]  Abnormal            Final result                 Please view results for these tests on the individual orders.   Urine Drugs of Abuse Screen Panel 13   Result Value Ref Range    Cannabinoids (22-ori-5-carboxy-9-THC) Presumptive positive-Unconfirmed result (A) Not Detected    Phencyclidine Not Detected Not Detected    Cocaine (Benzoylecgonine) Not Detected Not Detected    Methamphetamine (d-Methamphetamine) Not Detected Not Detected    Opiates (Morphine) Not Detected Not Detected    Amphetamine (d-Amphetamine) Not Detected Not Detected    Benzodiazepines (Nordiazepam) Not Detected Not Detected    Tricyclic Antidepressants (Desipramine) Not Detected Not Detected    Methadone Not Detected Not Detected    Barbiturates (Butalbital) Not Detected Not Detected    Oxycodone Not Detected Not Detected    Propoxyphene (Norpropoxyphene) Not Detected Not Detected    Buprenorphine Not Detected Not Detected   CBC with platelets differential    Narrative    The following orders were created for panel order CBC with platelets differential.  Procedure                               Abnormality         Status                     ---------                               -----------         ------                     CBC with platelets and d...[[id number]]  Abnormal            Final result                 Please view results for these tests on the individual orders.   Comprehensive metabolic panel   Result Value Ref Range    Sodium 142 134 - 144 mmol/L    Potassium 3.5 3.5 - 5.1 mmol/L    Chloride 105 98 - 107 mmol/L    Carbon Dioxide  (CO2) 27 21 - 31 mmol/L    Anion Gap 10 3 - 14 mmol/L    Urea Nitrogen 8 7 - 25 mg/dL    Creatinine 0.86 0.70 - 1.30 mg/dL    Calcium 9.5 8.6 - 10.3 mg/dL    Glucose 98 70 - 105 mg/dL    Alkaline Phosphatase 99 34 - 104 U/L     (H) 13 - 39 U/L     (H) 7 - 52 U/L    Protein Total 8.1 6.4 - 8.9 g/dL    Albumin 4.5 3.5 - 5.7 g/dL    Bilirubin Total 0.5 0.3 - 1.0 mg/dL    GFR Estimate >90 >60 mL/min/1.73m2   Ethanol GH   Result Value Ref Range    Alcohol ethyl 0.45 (HH) <=0.01 g/dL   Magnesium   Result Value Ref Range    Magnesium 2.3 1.9 - 2.7 mg/dL   CBC with platelets and differential   Result Value Ref Range    WBC Count 5.5 4.0 - 11.0 10e3/uL    RBC Count 4.50 4.40 - 5.90 10e6/uL    Hemoglobin 15.0 13.3 - 17.7 g/dL    Hematocrit 42.8 40.0 - 53.0 %    MCV 95 78 - 100 fL    MCH 33.3 (H) 26.5 - 33.0 pg    MCHC 35.0 31.5 - 36.5 g/dL    RDW 12.9 10.0 - 15.0 %    Platelet Count 232 150 - 450 10e3/uL    % Neutrophils 48 %    % Lymphocytes 33 %    % Monocytes 11 %    % Eosinophils 5 %    % Basophils 3 %    % Immature Granulocytes 0 %    NRBCs per 100 WBC 0 <1 /100    Absolute Neutrophils 2.6 1.6 - 8.3 10e3/uL    Absolute Lymphocytes 1.8 0.8 - 5.3 10e3/uL    Absolute Monocytes 0.6 0.0 - 1.3 10e3/uL    Absolute Eosinophils 0.3 0.0 - 0.7 10e3/uL    Absolute Basophils 0.1 0.0 - 0.2 10e3/uL    Absolute Immature Granulocytes 0.0 <=0.0 10e3/uL    Absolute NRBCs 0.0 10e3/uL   Lipase   Result Value Ref Range    Lipase 79 11 - 82 U/L   Acetaminophen GH   Result Value Ref Range    Acetaminophen <2 (L) 10 - 30 mg/L   INR   Result Value Ref Range    INR 1.09 0.85 - 1.15   Ethanol GH   Result Value Ref Range    Alcohol ethyl 0.23 (H) <=0.01 g/dL   Comprehensive metabolic panel   Result Value Ref Range    Sodium 144 134 - 144 mmol/L    Potassium 3.3 (L) 3.5 - 5.1 mmol/L    Chloride 108 (H) 98 - 107 mmol/L    Carbon Dioxide (CO2) 24 21 - 31 mmol/L    Anion Gap 12 3 - 14 mmol/L    Urea Nitrogen 9 7 - 25 mg/dL    Creatinine  0.79 0.70 - 1.30 mg/dL    Calcium 8.8 8.6 - 10.3 mg/dL    Glucose 85 70 - 105 mg/dL    Alkaline Phosphatase 82 34 - 104 U/L     (H) 13 - 39 U/L     (H) 7 - 52 U/L    Protein Total 6.9 6.4 - 8.9 g/dL    Albumin 4.0 3.5 - 5.7 g/dL    Bilirubin Total 0.4 0.3 - 1.0 mg/dL    GFR Estimate >90 >60 mL/min/1.73m2       Medications   lactated ringers infusion (has no administration in time range)   cyanocobalamin (VITAMIN B-12) tablet 1,000 mcg (has no administration in time range)   nicotine Patch in Place ( Transdermal Patch in Place 9/6/21 2354)   nicotine (NICODERM CQ) 14 MG/24HR 24 hr patch 1 patch (0 patches Transdermal Hold 9/6/21 2354)   lactated ringers BOLUS 500 mL (500 mLs Intravenous New Bag 9/6/21 2353)   thiamine (B-1) tablet 100 mg (100 mg Oral Given 9/6/21 2353)   folic acid (FOLVITE) tablet 1 mg (1 mg Oral Given 9/6/21 2353)       Assessments & Plan (with Medical Decision Making)     I have reviewed the nursing notes.    I have reviewed the findings, diagnosis, plan and need for follow up with the patient.   Mr Mchugh is a 46-year-old man who presents the emergency department with intoxication, desire to go to detox.    New Prescriptions    No medications on file       Final diagnoses:   Alcoholic intoxication without complication (H)       9/6/2021   [facility]    Care patient turned over to myself at change of shift.  Repeat alcohol was decreased from initial but still elevated 2.23.  He is however quite lucid with this.  He is able to ambulate.  Is able to eat and keep things down.  He has agreed to go to detox at this time.  Will be discharged to go to detox.    (F10.920) Alcoholic intoxication without complication (H)         Nav Taylor MD  09/07/21 0917       Nav Taylor MD  09/07/21 0918

## 2021-09-07 NOTE — PROGRESS NOTES
Patient's brother called.  Patient was in [facility] for 2 days in the past week.  He left and started drinking immediately.  He can not go to his dad or his brother's house.      Called [facility] and they do have a bed available for patient.    Amee Moncada RN on 9/7/2021 at 9:10 AM

## 2021-09-07 NOTE — PROGRESS NOTES
Patient ambulated in the hallway without difficulty.  Breakfast was ordered.  He called his dad and had a agruement with him.  Patient states that he is OK going to detox.    Amee Moncada RN on 9/7/2021 at 7:59 AM

## 2021-09-07 NOTE — ED TRIAGE NOTES
"ED Nursing Triage Note (General)   ________________________________    Jose Eduardo Mchugh is a 46 year old Male that presents to triage via PD for medical clearance to go to detox.  When staff asked patient what brings him to the ER patient states he was at his fathers drinking and states his father did not like him drinking so he called the PD to bring him in.  When asked patients typical alcohol consumption patient states he has \"a couple beverages every couple of days and on the weekends I drink a lot\".  Throughout triage patient admits to daily alcohol consumption and states he has been drinking captain grant this evening, however, is unclear how much alcohol he has consumed this evening.  Patients speech is slurred and eyes are noticeably reddened and glazed.  Patient has no prior hx of attempting to detox in the past.   When speaking to PD, police state that patient recently lost his home and has been staying with his father.  Patient voluntarily admitted himself to detox on August 30th, however, left detox a few days later and has been binge drinking since then.  Father called PD to get help for his son and to have him brought to detox.  On PD arrival patients breathalyzer revealed an alcohol level of .37, however, PD states that patient refused to leave the home until he finished his bottle of captain so PD believe patient is higher now.  PD attempted to bring patient to detox, however, detox will not take patient at this level until he is medically cleared.  Significant symptoms had onset 4 hours ago.  GCS-15  Airway:intact  Breathing noted as Normal  Action taken: 3      PRE HOSPITAL PRIOR LIVING SITUATION-home  "

## 2021-09-15 NOTE — PROGRESS NOTES
SUBJECTIVE:   CC: Jose Eduardo Mchugh is an 46 year old male who presents for preventative health visit.       Patient has been advised of split billing requirements and indicates understanding: Yes  HPI  Here for intake physical for [facility].  Arrived at [facility] about 1 week ago for alcohol and marijuana use.  He will be there for 30 days.  He has no concerns today.  Not on any chronic medications.  He was recently in the ED on 9/6 where labs were checked.  Only abnormalities were elevated LFTs but patient was currently using alcohol.  He is due for diabetes and lipid screening.      STD concerns: No  Cholesterol/DM concerns/screening: Due  Prostate cancer screening discussed:  Not indicated, patient is average risk and younger than 50.  Family history of colon or prostate CA?: No  Colonoscopy: Not indicated, patient is average risk and younger than 50.  Tobacco use: About 1 ppd or less on average  Immunizations: UTD per patient report    Today's PHQ-2 Score:   PHQ-2 ( 1999 Pfizer) 9/16/2021   Q1: Little interest or pleasure in doing things 0   Q2: Feeling down, depressed or hopeless 0   PHQ-2 Score 0       Do you feel safe in your environment? Yes    Have you ever done Advance Care Planning? (For example, a Health Directive, POLST, or a discussion with a medical provider or your loved ones about your wishes): No, advance care planning information given to patient to review.  Patient plans to discuss their wishes with loved ones or provider.      Social History     Tobacco Use     Smoking status: Current Every Day Smoker     Smokeless tobacco: Never Used   Substance Use Topics     Alcohol use: Not Currently     Comment: entered treatment 9/9/2021     If you drink alcohol do you typically have >3 drinks per day or >7 drinks per week? Not applicable    No flowsheet data found.    Last PSA: No results found for: PSA    Reviewed orders with patient. Reviewed health maintenance and updated orders accordingly -  "Yes      Reviewed and updated as needed this visit by clinical staff  Tobacco  Allergies  Meds     Fam Hx  Soc Hx        Reviewed and updated as needed this visit by Provider        Fam Hx         Past Medical History:   Diagnosis Date     Contact with and (suspected) exposure to other hazardous, chiefly nonmedicinal, chemicals     Solvents, dust, mine dust and grease      Past Surgical History:   Procedure Laterality Date     OTHER SURGICAL HISTORY      70996.0,PAST SURGICAL HISTORY,none       Review of Systems  CONSTITUTIONAL: NEGATIVE for fever, chills, change in weight  INTEGUMENTARY/SKIN: NEGATIVE for worrisome rashes, moles or lesions  EYES: NEGATIVE for vision changes or irritation  ENT: NEGATIVE for ear, mouth and throat problems  RESP: NEGATIVE for significant cough or SOB  CV: NEGATIVE for chest pain, palpitations or peripheral edema  GI: NEGATIVE for nausea, abdominal pain, heartburn, or change in bowel habits   male: negative for dysuria, hematuria, decreased urinary stream, erectile dysfunction, urethral discharge  MUSCULOSKELETAL: NEGATIVE for significant arthralgias or myalgia  NEURO: NEGATIVE for weakness, dizziness or paresthesias  PSYCHIATRIC: NEGATIVE for changes in mood or affect    OBJECTIVE:   /76   Pulse 107   Temp 97.2  F (36.2  C)   Resp 14   Ht 1.778 m (5' 10\")   Wt 84.5 kg (186 lb 3.2 oz)   SpO2 96%   BMI 26.72 kg/m      Physical Exam  GENERAL: healthy, alert and no distress  EYES: Eyes grossly normal to inspection, PERRL and conjunctivae and sclerae normal  HENT: ear canals and TM's normal, nose and mouth without ulcers or lesions  NECK: no adenopathy, no asymmetry, masses, or scars and thyroid normal to palpation  RESP: lungs clear to auscultation - no rales, rhonchi or wheezes  CV: regular rate and rhythm, normal S1 S2, no S3 or S4, no murmur, click or rub, no peripheral edema and peripheral pulses strong  ABDOMEN: soft, nontender, no hepatosplenomegaly, no masses " and bowel sounds normal  MS: no gross musculoskeletal defects noted, no edema  SKIN: no suspicious lesions or rashes  NEURO: Normal strength and tone, mentation intact and speech normal  PSYCH: mentation appears normal, affect normal/bright    Diagnostic Test Results:  none     ASSESSMENT/PLAN:       ICD-10-CM    1. Routine general medical examination at a health care facility  Z00.00 CBC and Differential     Comprehensive Metabolic Panel   2. Alcohol abuse  F10.10    3. Tobacco abuse  Z72.0    4. Screen for STD (sexually transmitted disease)  Z11.3 Hepatitis C Screen Reflex to HCV RNA Quant and Genotype   5. Lipid screening  Z13.220 Lipid Panel   6. Screening for diabetes mellitus  Z13.1 Hemoglobin A1c     1.  Up-to-date on screening and immunizations.  Basic lab work including CBC, CMP, lipid panel, A1c will be completed at a lab only appointment in few weeks to allow for time for liver enzymes to normalize since tobacco cessation.  Patient will schedule lab only appointment at his convenience.  Declined influenza vaccine.    2. Patient with a recent history of alcohol abuse.  Currently in treatment in [place] in recovery.  Doing well so far.     3.  Patient continues smoke about 1 pack/day.  Not interested in quitting.    4. We will screen for hepatitis C 1 time.  Patient denies STD concerns.  Will notify with results.    5.  Lipid panel will be completed at lab only appointment.  Will notify with results and treat if indicated.  Encourage healthy diet and exercise.    6.  Hemoglobin A1c will be completed a lab only appointment.  Will notify with results and treat if indicated.  Encourage healthy diet and exercise.      Patient has been advised of split billing requirements and indicates understanding: Yes  COUNSELING:   Reviewed preventive health counseling, as reflected in patient instructions    Estimated body mass index is 26.72 kg/m  as calculated from the following:    Height as of this encounter: 1.778 m (5'  "10\").    Weight as of this encounter: 84.5 kg (186 lb 3.2 oz).     Weight management plan: Discussed healthy diet and exercise guidelines    He reports that he has been smoking. He has never used smokeless tobacco.  Tobacco Cessation Action Plan:   Information offered: Patient not interested at this time      Counseling Resources:  ATP IV Guidelines  Pooled Cohorts Equation Calculator  FRAX Risk Assessment  ICSI Preventive Guidelines  Dietary Guidelines for Americans, 2010  USDA's MyPlate  ASA Prophylaxis  Lung CA Screening    Rosamaria Srinivasan PA-C  [facility]  "

## 2021-09-16 ENCOUNTER — OFFICE VISIT (OUTPATIENT)
Dept: FAMILY MEDICINE | Facility: OTHER | Age: 46
End: 2021-09-16
Attending: PHYSICIAN ASSISTANT
Payer: COMMERCIAL

## 2021-09-16 VITALS
TEMPERATURE: 97.2 F | OXYGEN SATURATION: 96 % | HEART RATE: 107 BPM | RESPIRATION RATE: 14 BRPM | DIASTOLIC BLOOD PRESSURE: 76 MMHG | WEIGHT: 186.2 LBS | BODY MASS INDEX: 26.66 KG/M2 | HEIGHT: 70 IN | SYSTOLIC BLOOD PRESSURE: 124 MMHG

## 2021-09-16 DIAGNOSIS — Z11.3 SCREEN FOR STD (SEXUALLY TRANSMITTED DISEASE): ICD-10-CM

## 2021-09-16 DIAGNOSIS — Z13.220 LIPID SCREENING: ICD-10-CM

## 2021-09-16 DIAGNOSIS — Z72.0 TOBACCO ABUSE: ICD-10-CM

## 2021-09-16 DIAGNOSIS — Z13.1 SCREENING FOR DIABETES MELLITUS: ICD-10-CM

## 2021-09-16 DIAGNOSIS — F10.10 ALCOHOL ABUSE: ICD-10-CM

## 2021-09-16 DIAGNOSIS — Z00.00 ROUTINE GENERAL MEDICAL EXAMINATION AT A HEALTH CARE FACILITY: Primary | ICD-10-CM

## 2021-09-16 PROCEDURE — 99396 PREV VISIT EST AGE 40-64: CPT | Performed by: PHYSICIAN ASSISTANT

## 2021-09-16 ASSESSMENT — ANXIETY QUESTIONNAIRES
1. FEELING NERVOUS, ANXIOUS, OR ON EDGE: NOT AT ALL
6. BECOMING EASILY ANNOYED OR IRRITABLE: NOT AT ALL
3. WORRYING TOO MUCH ABOUT DIFFERENT THINGS: NOT AT ALL
7. FEELING AFRAID AS IF SOMETHING AWFUL MIGHT HAPPEN: NOT AT ALL
2. NOT BEING ABLE TO STOP OR CONTROL WORRYING: NOT AT ALL
GAD7 TOTAL SCORE: 0
5. BEING SO RESTLESS THAT IT IS HARD TO SIT STILL: NOT AT ALL
IF YOU CHECKED OFF ANY PROBLEMS ON THIS QUESTIONNAIRE, HOW DIFFICULT HAVE THESE PROBLEMS MADE IT FOR YOU TO DO YOUR WORK, TAKE CARE OF THINGS AT HOME, OR GET ALONG WITH OTHER PEOPLE: NOT DIFFICULT AT ALL

## 2021-09-16 ASSESSMENT — PAIN SCALES - GENERAL: PAINLEVEL: NO PAIN (0)

## 2021-09-16 ASSESSMENT — MIFFLIN-ST. JEOR: SCORE: 1730.85

## 2021-09-16 ASSESSMENT — PATIENT HEALTH QUESTIONNAIRE - PHQ9: 5. POOR APPETITE OR OVEREATING: NOT AT ALL

## 2021-09-16 NOTE — NURSING NOTE
Patient presents to clinic for intake physical for [facility].  Elizabeth Ma LPN ....................  9/16/2021   8:39 AM

## 2021-09-17 ASSESSMENT — ANXIETY QUESTIONNAIRES: GAD7 TOTAL SCORE: 0

## 2021-09-23 ENCOUNTER — TELEPHONE (OUTPATIENT)
Dept: FAMILY MEDICINE | Facility: OTHER | Age: 46
End: 2021-09-23

## 2021-09-23 NOTE — TELEPHONE ENCOUNTER
Ok to take AlkaSeltzer cold medicine prn for cough and cold symptoms.   Rosamaria Srinivasan PA-C on 9/23/2021 at 11:24 AM

## 2021-09-23 NOTE — TELEPHONE ENCOUNTER
Notified Kim at [facility] note.  Elizabeth Ma LPN ....................  9/23/2021   11:47 AM

## 2021-10-01 NOTE — PROGRESS NOTES
Assessment & Plan     1. Screen for STD (sexually transmitted disease)  Follow up for labs. Results pending. Will notify with results and treat if indicated.   - Hepatitis C Screen Reflex to HCV RNA Quant and Genotype    2. Screening for diabetes mellitus  Follow up for labs. Results pending. Will notify with results and treat if indicated.   - Hemoglobin A1c    3. Lipid screening  Follow up for labs. Results pending. Will notify with results and treat if indicated.   - Lipid Panel    4. Routine general medical examination at a health care facility  Follow up for labs. Results pending. Will notify with results and treat if indicated.   - Comprehensive Metabolic Panel  - CBC and Differential    5. Rectal mass  Exam showing rectal mass most consistent with external hemorrhoids. Has struggled with this on and off for the past 1.5 years. Consistent bleeding throughout the day. Patient requesting to meet with general surgery for additional evaluation. Referral placed.   - Adult General Surg Referral; Future    No follow-ups on file.    Rosamaria Srinivasan PA-C  [facility]    Subjective   Jose Eduardo is a 46 year old who presents for the following health issues    HPI   Here for follow up on labs that were ordered during his physical previously. Needing follow up as LFT's were elevated in hospital while he was intoxicated. Also needing screening labs completed. Orders were already placed previously. Has also struggled with hemorrhoids for quite some time. Come and go, worse over the past 1-1.5 years. Has been trying OTC management with out much relief including creams, medicated pads, etc. Reports two BM's daily on average. Is not straining to go. Reports he is staying well hydrated, getting plenty of fiber daily. Reports more recently he has been struggling with rectal bleeding associated with this. Is having to keep paper towel tucked in his buttocks as he is mildly bleeding from this area throughout the  "day. Minimal associated pain.         PAST MEDICAL HISTORY:   Past Medical History:   Diagnosis Date     Contact with and (suspected) exposure to other hazardous, chiefly nonmedicinal, chemicals     Solvents, dust, mine dust and grease       PAST SURGICAL HISTORY:   Past Surgical History:   Procedure Laterality Date     OTHER SURGICAL HISTORY      26793.0,PAST SURGICAL HISTORY,none       FAMILY HISTORY:   Family History   Problem Relation Age of Onset     Thyroid Disease Mother      Breast Cancer Mother      Diabetes Type 2  Father        SOCIAL HISTORY:   Social History     Tobacco Use     Smoking status: Current Every Day Smoker     Smokeless tobacco: Never Used   Substance Use Topics     Alcohol use: Not Currently     Comment: entered treatment 9/9/2021      No Known Allergies  Current Outpatient Medications   Medication     Phenyleph-Doxylamine-DM-APAP (ZHOU-SELREANNAER PLS ALLERGY & [facility] PO)     No current facility-administered medications for this visit.         Review of Systems   Per HPI        Objective    /82   Pulse 78   Temp 96.9  F (36.1  C)   Resp 12   Ht 1.778 m (5' 10\")   Wt 86.3 kg (190 lb 3.2 oz)   SpO2 99%   BMI 27.29 kg/m    Body mass index is 27.29 kg/m .  Physical Exam   General: Pleasant, in no apparent distress.  Cardiovascular: Regular rate and rhythm with S1 equal to S2. No murmurs, friction rubs, or gallops.   Respiratory: Lungs are resonant and clear to auscultation bilaterally. No wheezes, crackles, or rhonchi.  Rectal: Declines ANGEL. Upon observation there is a fairly large flesh colored rectal mass externally with some mild associated bleeding.   Psych: Appropriate mood and affect.            "

## 2021-10-04 ENCOUNTER — OFFICE VISIT (OUTPATIENT)
Dept: FAMILY MEDICINE | Facility: OTHER | Age: 46
End: 2021-10-04
Attending: PHYSICIAN ASSISTANT
Payer: COMMERCIAL

## 2021-10-04 VITALS
DIASTOLIC BLOOD PRESSURE: 82 MMHG | SYSTOLIC BLOOD PRESSURE: 130 MMHG | HEART RATE: 78 BPM | RESPIRATION RATE: 12 BRPM | WEIGHT: 190.2 LBS | BODY MASS INDEX: 27.23 KG/M2 | OXYGEN SATURATION: 99 % | HEIGHT: 70 IN | TEMPERATURE: 96.9 F

## 2021-10-04 DIAGNOSIS — Z11.3 SCREEN FOR STD (SEXUALLY TRANSMITTED DISEASE): ICD-10-CM

## 2021-10-04 DIAGNOSIS — Z13.220 LIPID SCREENING: ICD-10-CM

## 2021-10-04 DIAGNOSIS — Z00.00 ROUTINE GENERAL MEDICAL EXAMINATION AT A HEALTH CARE FACILITY: ICD-10-CM

## 2021-10-04 DIAGNOSIS — K62.89 RECTAL MASS: ICD-10-CM

## 2021-10-04 DIAGNOSIS — Z13.1 SCREENING FOR DIABETES MELLITUS: ICD-10-CM

## 2021-10-04 LAB
ALBUMIN SERPL-MCNC: 4.5 G/DL (ref 3.5–5.7)
ALP SERPL-CCNC: 59 U/L (ref 34–104)
ALT SERPL W P-5'-P-CCNC: 36 U/L (ref 7–52)
ANION GAP SERPL CALCULATED.3IONS-SCNC: 8 MMOL/L (ref 3–14)
AST SERPL W P-5'-P-CCNC: 35 U/L (ref 13–39)
BASOPHILS # BLD AUTO: 0.1 10E3/UL (ref 0–0.2)
BASOPHILS NFR BLD AUTO: 1 %
BILIRUB SERPL-MCNC: 0.7 MG/DL (ref 0.3–1)
BUN SERPL-MCNC: 11 MG/DL (ref 7–25)
CALCIUM SERPL-MCNC: 10.1 MG/DL (ref 8.6–10.3)
CHLORIDE BLD-SCNC: 102 MMOL/L (ref 98–107)
CHOLEST SERPL-MCNC: 179 MG/DL
CO2 SERPL-SCNC: 28 MMOL/L (ref 21–31)
CREAT SERPL-MCNC: 0.84 MG/DL (ref 0.7–1.3)
EOSINOPHIL # BLD AUTO: 0.4 10E3/UL (ref 0–0.7)
EOSINOPHIL NFR BLD AUTO: 4 %
ERYTHROCYTE [DISTWIDTH] IN BLOOD BY AUTOMATED COUNT: 11.7 % (ref 10–15)
FASTING STATUS PATIENT QL REPORTED: YES
GFR SERPL CREATININE-BSD FRML MDRD: >90 ML/MIN/1.73M2
GLUCOSE BLD-MCNC: 96 MG/DL (ref 70–105)
HBA1C MFR BLD: 5.4 % (ref 4–6.2)
HCT VFR BLD AUTO: 46.9 % (ref 40–53)
HDLC SERPL-MCNC: 49 MG/DL (ref 23–92)
HGB BLD-MCNC: 16 G/DL (ref 13.3–17.7)
IMM GRANULOCYTES # BLD: 0 10E3/UL
IMM GRANULOCYTES NFR BLD: 0 %
LDLC SERPL CALC-MCNC: 107 MG/DL
LYMPHOCYTES # BLD AUTO: 2 10E3/UL (ref 0.8–5.3)
LYMPHOCYTES NFR BLD AUTO: 22 %
MCH RBC QN AUTO: 32.5 PG (ref 26.5–33)
MCHC RBC AUTO-ENTMCNC: 34.1 G/DL (ref 31.5–36.5)
MCV RBC AUTO: 95 FL (ref 78–100)
MONOCYTES # BLD AUTO: 0.8 10E3/UL (ref 0–1.3)
MONOCYTES NFR BLD AUTO: 9 %
NEUTROPHILS # BLD AUTO: 5.7 10E3/UL (ref 1.6–8.3)
NEUTROPHILS NFR BLD AUTO: 64 %
NONHDLC SERPL-MCNC: 130 MG/DL
NRBC # BLD AUTO: 0 10E3/UL
NRBC BLD AUTO-RTO: 0 /100
PLATELET # BLD AUTO: 255 10E3/UL (ref 150–450)
POTASSIUM BLD-SCNC: 4.3 MMOL/L (ref 3.5–5.1)
PROT SERPL-MCNC: 7.9 G/DL (ref 6.4–8.9)
RBC # BLD AUTO: 4.93 10E6/UL (ref 4.4–5.9)
SODIUM SERPL-SCNC: 138 MMOL/L (ref 134–144)
TRIGL SERPL-MCNC: 117 MG/DL
WBC # BLD AUTO: 9.1 10E3/UL (ref 4–11)

## 2021-10-04 PROCEDURE — 83036 HEMOGLOBIN GLYCOSYLATED A1C: CPT | Mod: ZL | Performed by: PHYSICIAN ASSISTANT

## 2021-10-04 PROCEDURE — 36415 COLL VENOUS BLD VENIPUNCTURE: CPT | Mod: ZL | Performed by: PHYSICIAN ASSISTANT

## 2021-10-04 PROCEDURE — 80061 LIPID PANEL: CPT | Mod: ZL | Performed by: PHYSICIAN ASSISTANT

## 2021-10-04 PROCEDURE — 86803 HEPATITIS C AB TEST: CPT | Mod: ZL | Performed by: PHYSICIAN ASSISTANT

## 2021-10-04 PROCEDURE — 99213 OFFICE O/P EST LOW 20 MIN: CPT | Performed by: PHYSICIAN ASSISTANT

## 2021-10-04 PROCEDURE — 85041 AUTOMATED RBC COUNT: CPT | Mod: ZL | Performed by: PHYSICIAN ASSISTANT

## 2021-10-04 PROCEDURE — 80053 COMPREHEN METABOLIC PANEL: CPT | Mod: ZL | Performed by: PHYSICIAN ASSISTANT

## 2021-10-04 PROCEDURE — G0463 HOSPITAL OUTPT CLINIC VISIT: HCPCS

## 2021-10-04 ASSESSMENT — ANXIETY QUESTIONNAIRES
2. NOT BEING ABLE TO STOP OR CONTROL WORRYING: NOT AT ALL
5. BEING SO RESTLESS THAT IT IS HARD TO SIT STILL: NOT AT ALL
6. BECOMING EASILY ANNOYED OR IRRITABLE: NOT AT ALL
GAD7 TOTAL SCORE: 0
3. WORRYING TOO MUCH ABOUT DIFFERENT THINGS: NOT AT ALL
IF YOU CHECKED OFF ANY PROBLEMS ON THIS QUESTIONNAIRE, HOW DIFFICULT HAVE THESE PROBLEMS MADE IT FOR YOU TO DO YOUR WORK, TAKE CARE OF THINGS AT HOME, OR GET ALONG WITH OTHER PEOPLE: NOT DIFFICULT AT ALL
7. FEELING AFRAID AS IF SOMETHING AWFUL MIGHT HAPPEN: NOT AT ALL
1. FEELING NERVOUS, ANXIOUS, OR ON EDGE: NOT AT ALL

## 2021-10-04 ASSESSMENT — PATIENT HEALTH QUESTIONNAIRE - PHQ9: 5. POOR APPETITE OR OVEREATING: NOT AT ALL

## 2021-10-04 ASSESSMENT — PAIN SCALES - GENERAL: PAINLEVEL: NO PAIN (0)

## 2021-10-04 ASSESSMENT — MIFFLIN-ST. JEOR: SCORE: 1748.99

## 2021-10-04 NOTE — NURSING NOTE
Patient presents to clinic for follow up on labs.  Elizabeth Ma LPN ....................  10/4/2021   9:50 AM

## 2021-10-05 LAB — HCV AB SERPL QL IA: NONREACTIVE

## 2021-10-05 ASSESSMENT — ANXIETY QUESTIONNAIRES: GAD7 TOTAL SCORE: 0

## 2021-10-20 ENCOUNTER — ALLIED HEALTH/NURSE VISIT (OUTPATIENT)
Dept: FAMILY MEDICINE | Facility: OTHER | Age: 46
End: 2021-10-20
Attending: FAMILY MEDICINE
Payer: COMMERCIAL

## 2021-10-20 DIAGNOSIS — Z20.822 COVID-19 RULED OUT: Primary | ICD-10-CM

## 2021-10-20 PROCEDURE — C9803 HOPD COVID-19 SPEC COLLECT: HCPCS

## 2021-10-20 PROCEDURE — U0003 INFECTIOUS AGENT DETECTION BY NUCLEIC ACID (DNA OR RNA); SEVERE ACUTE RESPIRATORY SYNDROME CORONAVIRUS 2 (SARS-COV-2) (CORONAVIRUS DISEASE [COVID-19]), AMPLIFIED PROBE TECHNIQUE, MAKING USE OF HIGH THROUGHPUT TECHNOLOGIES AS DESCRIBED BY CMS-2020-01-R: HCPCS | Mod: ZL

## 2021-10-21 LAB — SARS-COV-2 RNA RESP QL NAA+PROBE: NEGATIVE

## 2021-10-25 ENCOUNTER — OFFICE VISIT (OUTPATIENT)
Dept: SURGERY | Facility: OTHER | Age: 46
End: 2021-10-25
Attending: PHYSICIAN ASSISTANT
Payer: COMMERCIAL

## 2021-10-25 VITALS
WEIGHT: 199 LBS | OXYGEN SATURATION: 97 % | HEART RATE: 84 BPM | SYSTOLIC BLOOD PRESSURE: 122 MMHG | HEIGHT: 70 IN | DIASTOLIC BLOOD PRESSURE: 64 MMHG | TEMPERATURE: 97.1 F | RESPIRATION RATE: 16 BRPM | BODY MASS INDEX: 28.49 KG/M2

## 2021-10-25 DIAGNOSIS — K64.2 GRADE III HEMORRHOIDS: Primary | ICD-10-CM

## 2021-10-25 PROCEDURE — 46600 DIAGNOSTIC ANOSCOPY SPX: CPT | Performed by: SURGERY

## 2021-10-25 PROCEDURE — 88304 TISSUE EXAM BY PATHOLOGIST: CPT

## 2021-10-25 PROCEDURE — G0463 HOSPITAL OUTPT CLINIC VISIT: HCPCS

## 2021-10-25 PROCEDURE — 46230 REMOVAL OF ANAL TAGS: CPT | Performed by: SURGERY

## 2021-10-25 RX ORDER — LIDOCAINE HYDROCHLORIDE 20 MG/ML
JELLY TOPICAL 4 TIMES DAILY PRN
Qty: 30 ML | Refills: 0 | Status: SHIPPED | OUTPATIENT
Start: 2021-10-25

## 2021-10-25 RX ORDER — SENNA AND DOCUSATE SODIUM 50; 8.6 MG/1; MG/1
1 TABLET, FILM COATED ORAL 2 TIMES DAILY PRN
Qty: 30 TABLET | Refills: 0 | Status: SHIPPED | OUTPATIENT
Start: 2021-10-25

## 2021-10-25 ASSESSMENT — MIFFLIN-ST. JEOR: SCORE: 1780.97

## 2021-10-25 ASSESSMENT — PAIN SCALES - GENERAL: PAINLEVEL: NO PAIN (0)

## 2021-10-25 NOTE — PROGRESS NOTES
Procedure Note     Pre/Post Operative Diagnosis: Mixed internal and external hemorrhoid    Procedure:    Excision of large hemorrhoid    Surgeon: NAVARRO Santos MD     Local Anesthesia: 1% lidocaine with0.25%Marcaine with epinephrine    Indication for the procedure:    This is a 46 year old male patient with rectal bleeding and rectal mass.  Patient has had problems with rectal bleeding for quite some time.  This is a small amount of blood on the paper when he wipes.  Movements are normal for him.  He has soft, easy to pass bowel movements.  He has a bowel movement every day.  No previous history of colonoscopy.    We will plan for a rectal exam including anoscopy and possible intervention including hemorrhoid banding or hemorrhoid excision.    Procedure:   Patient was placed in the left lateral cubitus position.  The anal margin and anal verge were visually inspected and there is one large hemorrhoid that seems to be projecting from inside of the rectum and is continuous with a external hemorrhoid.  Digital rectal exam reveals likely more internal hemorrhoidal tissue.  Anoscopic exam reveals moderate internal hemorrhoidal burden including hemorrhoid that is projecting outward and concurrent with external hemorrhoid.  This is likely a grade 2 or 3 hemorrhoid that is concurrent with an external hemorrhoid.  It appears mildly irritated and is friable to touch.  Discussed with the patient this would likely be amenable to local resection with primary closure under local anesthetic.  The large hemorrhoid was infiltrated with local anesthetic at its base and the hemorrhoid was excised at just above the base.  The resultant defect was closed with running 3-0 Chromic Gut suture.  Patient tolerated the procedure well.       Plan:  The patient will be called with pathology results.  Lidocaine jelly  Stool softener  Fiber supplement  Please keep area clean by doing a sitz bath or below the waist shower or clean the area with  peribottle after bowel movements      NAVARRO Santos MD

## 2021-10-25 NOTE — NURSING NOTE
"Chief Complaint   Patient presents with     Procedure     hemorrhoids       Initial /64 (BP Location: Right arm, Patient Position: Sitting, Cuff Size: Adult Regular)   Pulse 84   Temp 97.1  F (36.2  C) (Temporal)   Resp 16   Ht 1.765 m (5' 9.5\")   Wt 90.3 kg (199 lb)   SpO2 97%   BMI 28.97 kg/m   Estimated body mass index is 28.97 kg/m  as calculated from the following:    Height as of this encounter: 1.765 m (5' 9.5\").    Weight as of this encounter: 90.3 kg (199 lb).  Medication Reconciliation: Completed     [facility] Directive Reviewed    Macrina Rivera LPN  "

## 2021-10-26 ENCOUNTER — TELEPHONE (OUTPATIENT)
Dept: SURGERY | Facility: OTHER | Age: 46
End: 2021-10-26
Payer: COMMERCIAL

## 2021-10-26 DIAGNOSIS — K64.2 GRADE III HEMORRHOIDS: Primary | ICD-10-CM

## 2021-10-26 RX ORDER — LIDOCAINE/PRILOCAINE 2.5 %-2.5%
CREAM (GRAM) TOPICAL EVERY 4 HOURS PRN
Qty: 30 G | Refills: 1 | Status: SHIPPED | OUTPATIENT
Start: 2021-10-26

## 2021-10-26 NOTE — TELEPHONE ENCOUNTER
Due to being at [facility] Rx will need to be sent for fiber.  Also is there any substitute for the lidocaine gel that patient can use for pain?  Macrina Rivera LPN........................10/26/2021  8:46 AM

## 2021-10-26 NOTE — TELEPHONE ENCOUNTER
Fiber was not sent over to [facility]   And the lidocaine not covered by insurance is there something else or send prior auth paper work.     Please call call Velma back   thank you Evie Cooper on 10/26/2021 at 8:28 AM

## 2021-10-27 ENCOUNTER — TELEPHONE (OUTPATIENT)
Dept: FAMILY MEDICINE | Facility: OTHER | Age: 46
End: 2021-10-27

## 2021-10-27 NOTE — TELEPHONE ENCOUNTER
Patient needs a prescription for fiber after surgery with Dr. Santos. [facility] will not  give it to him without a prescription from a provider. Patient said [facility] is the one who referred him to Dr. Santos. Fax for [facility] is [phone].    Alis Early on 10/27/2021 at 12:47 PM

## 2021-10-28 NOTE — TELEPHONE ENCOUNTER
Notified [facility] that rx was sent on 10/26/2021 for Fiber.  Elizabeth Ma LPN ....................  10/28/2021   7:31 AM

## 2021-10-29 LAB
PATH REPORT.COMMENTS IMP SPEC: NORMAL
PATH REPORT.FINAL DX SPEC: NORMAL
PHOTO IMAGE: NORMAL

## (undated) RX ORDER — LANOLIN ALCOHOL/MO/W.PET/CERES
CREAM (GRAM) TOPICAL
Status: DISPENSED
Start: 2021-09-06

## (undated) RX ORDER — SODIUM CHLORIDE, SODIUM LACTATE, POTASSIUM CHLORIDE, CALCIUM CHLORIDE 600; 310; 30; 20 MG/100ML; MG/100ML; MG/100ML; MG/100ML
INJECTION, SOLUTION INTRAVENOUS
Status: DISPENSED
Start: 2021-09-06

## (undated) RX ORDER — FOLIC ACID 1 MG/1
TABLET ORAL
Status: DISPENSED
Start: 2021-09-06